# Patient Record
Sex: FEMALE | Race: WHITE | NOT HISPANIC OR LATINO | Employment: FULL TIME | URBAN - METROPOLITAN AREA
[De-identification: names, ages, dates, MRNs, and addresses within clinical notes are randomized per-mention and may not be internally consistent; named-entity substitution may affect disease eponyms.]

---

## 2017-01-23 ENCOUNTER — TELEPHONE (OUTPATIENT)
Dept: INTERNAL MEDICINE | Facility: CLINIC | Age: 62
End: 2017-01-23

## 2017-01-23 ENCOUNTER — HOSPITAL ENCOUNTER (OUTPATIENT)
Dept: RADIOLOGY | Facility: HOSPITAL | Age: 62
Discharge: HOME | End: 2017-01-23
Attending: ORTHOPAEDIC SURGERY
Payer: COMMERCIAL

## 2017-01-23 ENCOUNTER — OFFICE VISIT (OUTPATIENT)
Dept: ORTHOPEDICS | Facility: CLINIC | Age: 62
End: 2017-01-23
Payer: COMMERCIAL

## 2017-01-23 VITALS
BODY MASS INDEX: 25.78 KG/M2 | HEIGHT: 64 IN | WEIGHT: 151 LBS | SYSTOLIC BLOOD PRESSURE: 157 MMHG | RESPIRATION RATE: 16 BRPM | HEART RATE: 79 BPM | DIASTOLIC BLOOD PRESSURE: 79 MMHG

## 2017-01-23 DIAGNOSIS — Z00.00 ANNUAL PHYSICAL EXAM: Primary | ICD-10-CM

## 2017-01-23 DIAGNOSIS — M54.42 ACUTE BACK PAIN WITH SCIATICA, LEFT: ICD-10-CM

## 2017-01-23 DIAGNOSIS — M25.552 LEFT HIP PAIN: Primary | ICD-10-CM

## 2017-01-23 DIAGNOSIS — M25.552 LEFT HIP PAIN: ICD-10-CM

## 2017-01-23 PROCEDURE — 72114 X-RAY EXAM L-S SPINE BENDING: CPT | Mod: 26,,, | Performed by: RADIOLOGY

## 2017-01-23 PROCEDURE — 99999 PR PBB SHADOW E&M-EST. PATIENT-LVL IV: CPT | Mod: PBBFAC,,, | Performed by: PHYSICIAN ASSISTANT

## 2017-01-23 PROCEDURE — 73502 X-RAY EXAM HIP UNI 2-3 VIEWS: CPT | Mod: TC,LT

## 2017-01-23 PROCEDURE — 1159F MED LIST DOCD IN RCRD: CPT | Mod: S$GLB,,, | Performed by: PHYSICIAN ASSISTANT

## 2017-01-23 PROCEDURE — 73502 X-RAY EXAM HIP UNI 2-3 VIEWS: CPT | Mod: 26,LT,, | Performed by: RADIOLOGY

## 2017-01-23 PROCEDURE — 99214 OFFICE O/P EST MOD 30 MIN: CPT | Mod: S$GLB,,, | Performed by: PHYSICIAN ASSISTANT

## 2017-01-23 PROCEDURE — 72114 X-RAY EXAM L-S SPINE BENDING: CPT | Mod: TC

## 2017-01-23 RX ORDER — MELOXICAM 15 MG/1
15 TABLET ORAL DAILY
Qty: 30 TABLET | Refills: 2 | Status: SHIPPED | OUTPATIENT
Start: 2017-01-23 | End: 2017-02-22

## 2017-01-23 RX ORDER — CYCLOBENZAPRINE HCL 10 MG
10 TABLET ORAL 3 TIMES DAILY PRN
Qty: 30 TABLET | Refills: 0 | Status: SHIPPED | OUTPATIENT
Start: 2017-01-23 | End: 2017-02-02

## 2017-01-23 NOTE — TELEPHONE ENCOUNTER
----- Message from Mirella Orourke sent at 1/23/2017  4:01 PM CST -----  Contact: Self.   Doctor appointment and lab have been scheduled.  Please link lab orders to the lab appointment.  Date of doctor appointment:  4/26  Physical or EP:  Epp  Date of lab appointment:  4/19  Comments:

## 2017-01-23 NOTE — PROGRESS NOTES
SUBJECTIVE:     Chief Complaint & History of Present Illness:  Carla Aguilar is a New  patient 61 y.o. female who is seen here today with a complaint of    Chief Complaint   Patient presents with    Left Hip - Pain    .  Patient reports she developed pain and tenderness in the midportion of her low back for the past year.  Reports pain has increased to the past few months although there has been no significant change in her activity levels.  She does not have much tenderness to palpation but does notice pain originating in the midportion of low back and radiating down the left leg posterior lateral past the knee to the mid calf.  She denies any weakness paresthesias or changes in sensation no difficulty with bowel or bladder control  On a scale of 1-10, with 10 being worst pain imaginable, he rates this pain as 3 on good days and 5 on bad days.  she describes the pain as sore and achy.    Review of patient's allergies indicates:  No Known Allergies      Current Outpatient Prescriptions   Medication Sig Dispense Refill    cyclobenzaprine (FLEXERIL) 10 MG tablet Take 1 tablet (10 mg total) by mouth 3 (three) times daily as needed for Muscle spasms. 30 tablet 0    meloxicam (MOBIC) 15 MG tablet Take 1 tablet (15 mg total) by mouth once daily. 30 tablet 2    paroxetine (PAXIL) 10 MG tablet Take 1 tablet (10 mg total) by mouth once daily. 30 tablet 11    paroxetine (PAXIL) 20 MG tablet Take 1 tablet (20 mg total) by mouth once daily. 30 tablet 11     No current facility-administered medications for this visit.        Past Medical History   Diagnosis Date    Anxiety     Gallstones     Hyperlipidemia     Thrombocytopenia        Past Surgical History   Procedure Laterality Date    Thyroidectomy, partial         Vital Signs (Most Recent)  Vitals:    01/23/17 1343   BP: (!) 157/79   Pulse: 79   Resp: 16           Review of Systems:  ROS:  Constitutional: no fever or chills  Eyes: no visual changes  ENT: no nasal  "congestion or sore throat  Respiratory: no cough or shortness of breath  Cardiovascular: no chest pain or palpitations  Gastrointestinal: no nausea or vomiting, tolerating diet, Positive for follow-up liver disease  Genitourinary: no hematuria or dysuria  Integument/Breast: no rash or pruritis  Hematologic/Lymphatic: no easy bruising or lymphadenopathy  Musculoskeletal: no arthralgias or myalgias  Neurological: no seizures or tremors  Behavioral/Psych: no auditory or visual hallucinations, Positive for generalized anxiety disorder  Endocrine: no heat or cold intolerance                OBJECTIVE:     PHYSICAL EXAM:  Height: 5' 4" (162.6 cm) Weight: 68.5 kg (151 lb 0.2 oz), General Appearance: Well nourished, well developed, in no acute distress.  Neurological: Mood & affect are normal.  Back Exam:  full range of motion, no tenderness, palpable spasm or pain on motion, tenderness noted paraspinal muscle surrounding L4-L5 L5-S1, negative straight-leg raise bilaterally , normal reflexes and strength bilateral lower extremities, sensory exam intact bilateral lower extremities  Negative for: Kyphosis, Scoliosis, Decreased ROM and Positive for: Tenderness to percussion or palpation  Hip Examination:  full painless range of motion, without tenderness    RADIOGRAPHS:  X-rays lumbar spine demonstrates listhesis of L4 on L5 with neutral positioning     ASSESSMENT/PLAN:     Plan: We discussed with the patient at length all the different treatment options available for her spine including anti-inflammatories, acetaminophen, rest, ice, physical therapy, strengthening and range of motion exercise, occasional cortisone injections for temporary relief, and finally possible surgical interventions  We'll begin with a course of conservative treatment  Cyclobenzaprine 10 mg 3 times a day ×2 days followed by every afternoon  Meloxicam 15 mg daily with food ×10 days followed by when necessary  Physical therapy for spine program  Follow-up " in 3 weeks to assess progress sooner symptoms dictate

## 2017-01-30 ENCOUNTER — CLINICAL SUPPORT (OUTPATIENT)
Dept: REHABILITATION | Facility: HOSPITAL | Age: 62
End: 2017-01-30
Attending: ORTHOPAEDIC SURGERY
Payer: COMMERCIAL

## 2017-01-30 DIAGNOSIS — M54.42 ACUTE BACK PAIN WITH SCIATICA, LEFT: ICD-10-CM

## 2017-01-30 PROCEDURE — 97161 PT EVAL LOW COMPLEX 20 MIN: CPT

## 2017-01-30 PROCEDURE — 97110 THERAPEUTIC EXERCISES: CPT

## 2017-01-30 NOTE — PROGRESS NOTES
"[facility]  PATIENT EVALUATION        Name:Carla Aguilar  Physician:Dontae Trinidad MD  Orders:  Physical Therapy evaluate and treat  Clinical#:[id number]  Diagnosis:No diagnosis found.     Visit 1    HPI: Patient with 10 year h/o low back pain which has gotten worse over the past several months.  Pain with standing/cooking.  Unable to vacuum or complete household chores without pain.  Pain wakes her up at night, "tosses and turns"  Pain in back radiating into left leg.  (+) paresthesias. Pain relieved immediately upon sitting.  Has to sit after walking 5 minutes.  Has been going to an aquatic exercise twice a week for the past year without relief.    SUBJECTIVE:    Pain: 0/10 at best, 8/10 at worst, 0/10 currently  Chief complaint: low back pain, left leg pain  Radicular symptoms: left leg pain  Bowel and Bladder incontinence: none  Aggravating factors: vacuuming, walking  Easing factors: sitting  Sleep is  disturbed. Sleeping position: prone  Previous functional status: independent without limitation  Current functional status : pain limits walking and household chores      Allergies:  Review of patient's allergies indicates:  No Known Allergies    Medical history:   Past Medical History   Diagnosis Date    Anxiety     Gallstones     Hyperlipidemia     Thrombocytopenia        Past Surgical History   Procedure Laterality Date    Thyroidectomy, partial         Medication:   Current Outpatient Prescriptions on File Prior to Visit   Medication Sig Dispense Refill    cyclobenzaprine (FLEXERIL) 10 MG tablet Take 1 tablet (10 mg total) by mouth 3 (three) times daily as needed for Muscle spasms. 30 tablet 0    meloxicam (MOBIC) 15 MG tablet Take 1 tablet (15 mg total) by mouth once daily. 30 tablet 2    paroxetine (PAXIL) 10 MG tablet Take 1 tablet (10 mg total) by mouth once daily. 30 tablet 11    paroxetine (PAXIL) 20 MG tablet Take 1 tablet (20 mg total) by mouth once daily. 30 tablet 11 "     No current facility-administered medications on file prior to visit.        OBJECTIVE:_.  Posture:forward head, increased  thoracic kyphosis, normal lumbar lordosis      Lumbar AROM :   - flexion 0 cm (-)  - extension 75% (-)  - left sidebending 75% (-)  - right sidebending 75% (-)  - left rotation 75% (-)  - right rotation 75% (-)    LE strength:     R L   Hip flexion 5 5   Hip extension 4 4   Glute max 4 4-   Hip abduction 4 4   Knee extension 5 5   Knee flexion 5 5   Ankle DF 5 5   Ankle PF 5 5       Special tests:  (-) MICHAEL    Palpation: tender left ITB    Joint mobility: (+) stiffness CPA L5,4,3      Sensation: light touch intact BLE    Reflexes: NT    TREATMENT:  Pt was educated, performed, and was provided with a written copy of  HEP to perform as tolerated, including:    Bridges  LTR  Bicycle stabilization  Side lying clamshells with TB    Exercises were reviewed and pt was able to demonstrate them prior to the end of the session.     No cultural, environmental, or spiritual barriers identified to treatment or learning.      ASSESSMENT   Patient presents with limitations in  ROM, strength and posture affecting her ADLs and household chores .  She  will benefit from outpatient physical therapy to improve her ROM, strength and posture to enable her  to return to full pain free function      Medical necessity is demonstrated by the following  IMPAIRMENTS:  - poor posture  - pain  - decreased flexibility  - decreased muscle strength  - impaired function  - decreased work ability      SHORT TERM GOALS:  (4  Weeks) Pt agrees with goals set.  1. Supervised HEP.  2. Able to walk 1 mile with less than 3/10 pain  3. Able to cook with less than 3/10 pain  4. ROM WNL to enable normal gait and ADLs    LONG TERM GOALS:  (8  Weeks) Pt agrees with goals set.  1. Independent with HEP.  2. Able to walk 3 miles with less than 3/10 pain  3. Able to vacuum with less than 3/10 pain  4. Strength WNL to enable normal gait and  ADLs    PLAN:  Outpatient physical therapy once every 2 weeks to include: pt ed, hep, therapeutic exercises, neuromuscular re-education/ balance exercises, joint mobilizations, modalities prn. Pt may be seen by PTA to carry out plan of care.      Cont PT for 8 weeks.     Nathen Bullock, PT, DPT    I certify the need for these services furnished under this plan of treatment and while under my care.    ____________________________________ Physician/Referring Practitioner                                               Date of Signature

## 2017-02-06 NOTE — PLAN OF CARE
"[facility]  PATIENT EVALUATION        Name:Carla Aguilar  Physician:Dontae Trinidad MD  Orders:  Physical Therapy evaluate and treat  Clinical#:[id number]  Diagnosis:No diagnosis found.     Visit 1    HPI: Patient with 10 year h/o low back pain which has gotten worse over the past several months.  Pain with standing/cooking.  Unable to vacuum or complete household chores without pain.  Pain wakes her up at night, "tosses and turns"  Pain in back radiating into left leg.  (+) paresthesias. Pain relieved immediately upon sitting.  Has to sit after walking 5 minutes.  Has been going to an aquatic exercise twice a week for the past year without relief.    SUBJECTIVE:    Pain: 0/10 at best, 8/10 at worst, 0/10 currently  Chief complaint: low back pain, left leg pain  Radicular symptoms: left leg pain  Bowel and Bladder incontinence: none  Aggravating factors: vacuuming, walking  Easing factors: sitting  Sleep is  disturbed. Sleeping position: prone  Previous functional status: independent without limitation  Current functional status : pain limits walking and household chores      Allergies:  Review of patient's allergies indicates:  No Known Allergies    Medical history:   Past Medical History   Diagnosis Date    Anxiety     Gallstones     Hyperlipidemia     Thrombocytopenia        Past Surgical History   Procedure Laterality Date    Thyroidectomy, partial         Medication:   Current Outpatient Prescriptions on File Prior to Visit   Medication Sig Dispense Refill    cyclobenzaprine (FLEXERIL) 10 MG tablet Take 1 tablet (10 mg total) by mouth 3 (three) times daily as needed for Muscle spasms. 30 tablet 0    meloxicam (MOBIC) 15 MG tablet Take 1 tablet (15 mg total) by mouth once daily. 30 tablet 2    paroxetine (PAXIL) 10 MG tablet Take 1 tablet (10 mg total) by mouth once daily. 30 tablet 11    paroxetine (PAXIL) 20 MG tablet Take 1 tablet (20 mg total) by mouth once daily. 30 tablet 11 "     No current facility-administered medications on file prior to visit.        OBJECTIVE:_.  Posture:forward head, increased  thoracic kyphosis, normal lumbar lordosis      Lumbar AROM :   - flexion 0 cm (-)  - extension 75% (-)  - left sidebending 75% (-)  - right sidebending 75% (-)  - left rotation 75% (-)  - right rotation 75% (-)    LE strength:     R L   Hip flexion 5 5   Hip extension 4 4   Glute max 4 4-   Hip abduction 4 4   Knee extension 5 5   Knee flexion 5 5   Ankle DF 5 5   Ankle PF 5 5       Special tests:  (-) MICHAEL    Palpation: tender left ITB    Joint mobility: (+) stiffness CPA L5,4,3      Sensation: light touch intact BLE    Reflexes: NT    TREATMENT:  Pt was educated, performed, and was provided with a written copy of  HEP to perform as tolerated, including:    Bridges  LTR  Bicycle stabilization  Side lying clamshells with TB    Exercises were reviewed and pt was able to demonstrate them prior to the end of the session.     No cultural, environmental, or spiritual barriers identified to treatment or learning.      ASSESSMENT   Patient presents with limitations in  ROM, strength and posture affecting her ADLs and household chores .  She  will benefit from outpatient physical therapy to improve her ROM, strength and posture to enable her  to return to full pain free function      Medical necessity is demonstrated by the following  IMPAIRMENTS:  - poor posture  - pain  - decreased flexibility  - decreased muscle strength  - impaired function  - decreased work ability      SHORT TERM GOALS:  (4  Weeks) Pt agrees with goals set.  1. Supervised HEP.  2. Able to walk 1 mile with less than 3/10 pain  3. Able to cook with less than 3/10 pain  4. ROM WNL to enable normal gait and ADLs    LONG TERM GOALS:  (8  Weeks) Pt agrees with goals set.  1. Independent with HEP.  2. Able to walk 3 miles with less than 3/10 pain  3. Able to vacuum with less than 3/10 pain  4. Strength WNL to enable normal gait and  ADLs    PLAN:  Outpatient physical therapy once every 2 weeks to include: pt ed, hep, therapeutic exercises, neuromuscular re-education/ balance exercises, joint mobilizations, modalities prn. Pt may be seen by PTA to carry out plan of care.      Cont PT for 8 weeks.     Nahten Bullock, PT, DPT    I certify the need for these services furnished under this plan of treatment and while under my care.    ____________________________________ Physician/Referring Practitioner                                               Date of Signature

## 2017-04-19 ENCOUNTER — LAB VISIT (OUTPATIENT)
Dept: LAB | Facility: HOSPITAL | Age: 62
End: 2017-04-19
Attending: INTERNAL MEDICINE
Payer: COMMERCIAL

## 2017-04-19 DIAGNOSIS — Z00.00 ANNUAL PHYSICAL EXAM: ICD-10-CM

## 2017-04-19 LAB
25(OH)D3+25(OH)D2 SERPL-MCNC: 43 NG/ML
ALBUMIN SERPL BCP-MCNC: 4.4 G/DL
ALP SERPL-CCNC: 86 U/L
ALT SERPL W/O P-5'-P-CCNC: 66 U/L
ANION GAP SERPL CALC-SCNC: 14 MMOL/L
AST SERPL-CCNC: 78 U/L
BASOPHILS # BLD AUTO: 0.03 K/UL
BASOPHILS NFR BLD: 0.6 %
BILIRUB SERPL-MCNC: 0.7 MG/DL
BUN SERPL-MCNC: 8 MG/DL
CALCIUM SERPL-MCNC: 9.7 MG/DL
CHLORIDE SERPL-SCNC: 104 MMOL/L
CHOLEST/HDLC SERPL: 2.8 {RATIO}
CO2 SERPL-SCNC: 27 MMOL/L
CREAT SERPL-MCNC: 0.7 MG/DL
DIFFERENTIAL METHOD: ABNORMAL
EOSINOPHIL # BLD AUTO: 0 K/UL
EOSINOPHIL NFR BLD: 0.8 %
ERYTHROCYTE [DISTWIDTH] IN BLOOD BY AUTOMATED COUNT: 13.9 %
EST. GFR  (AFRICAN AMERICAN): >60 ML/MIN/1.73 M^2
EST. GFR  (NON AFRICAN AMERICAN): >60 ML/MIN/1.73 M^2
GLUCOSE SERPL-MCNC: 116 MG/DL
HCT VFR BLD AUTO: 40.6 %
HDL/CHOLESTEROL RATIO: 36.4 %
HDLC SERPL-MCNC: 124 MG/DL
HDLC SERPL-MCNC: 341 MG/DL
HGB BLD-MCNC: 13.8 G/DL
LDLC SERPL CALC-MCNC: 201.4 MG/DL
LYMPHOCYTES # BLD AUTO: 0.9 K/UL
LYMPHOCYTES NFR BLD: 17.4 %
MCH RBC QN AUTO: 36.2 PG
MCHC RBC AUTO-ENTMCNC: 34 %
MCV RBC AUTO: 107 FL
MONOCYTES # BLD AUTO: 0.7 K/UL
MONOCYTES NFR BLD: 13.4 %
NEUTROPHILS # BLD AUTO: 3.5 K/UL
NEUTROPHILS NFR BLD: 67.6 %
NONHDLC SERPL-MCNC: 217 MG/DL
PLATELET # BLD AUTO: 150 K/UL
PMV BLD AUTO: 11.2 FL
POTASSIUM SERPL-SCNC: 4 MMOL/L
PROT SERPL-MCNC: 7.5 G/DL
RBC # BLD AUTO: 3.81 M/UL
SODIUM SERPL-SCNC: 145 MMOL/L
TRIGL SERPL-MCNC: 78 MG/DL
TSH SERPL DL<=0.005 MIU/L-ACNC: 1.22 UIU/ML
WBC # BLD AUTO: 5.16 K/UL

## 2017-04-19 PROCEDURE — 80053 COMPREHEN METABOLIC PANEL: CPT

## 2017-04-19 PROCEDURE — 85025 COMPLETE CBC W/AUTO DIFF WBC: CPT

## 2017-04-19 PROCEDURE — 82306 VITAMIN D 25 HYDROXY: CPT

## 2017-04-19 PROCEDURE — 80061 LIPID PANEL: CPT

## 2017-04-19 PROCEDURE — 36415 COLL VENOUS BLD VENIPUNCTURE: CPT | Mod: PO

## 2017-04-19 PROCEDURE — 84443 ASSAY THYROID STIM HORMONE: CPT

## 2017-05-01 ENCOUNTER — OFFICE VISIT (OUTPATIENT)
Dept: INTERNAL MEDICINE | Facility: CLINIC | Age: 62
End: 2017-05-01
Payer: COMMERCIAL

## 2017-05-01 VITALS
BODY MASS INDEX: 27.36 KG/M2 | HEART RATE: 95 BPM | WEIGHT: 160.25 LBS | HEIGHT: 64 IN | SYSTOLIC BLOOD PRESSURE: 119 MMHG | DIASTOLIC BLOOD PRESSURE: 71 MMHG | RESPIRATION RATE: 16 BRPM | TEMPERATURE: 99 F

## 2017-05-01 DIAGNOSIS — Z12.31 VISIT FOR SCREENING MAMMOGRAM: ICD-10-CM

## 2017-05-01 DIAGNOSIS — K70.9 ALCOHOLIC LIVER DISEASE: ICD-10-CM

## 2017-05-01 DIAGNOSIS — K21.9 GASTROESOPHAGEAL REFLUX DISEASE, ESOPHAGITIS PRESENCE NOT SPECIFIED: ICD-10-CM

## 2017-05-01 DIAGNOSIS — E78.5 HYPERLIPIDEMIA, UNSPECIFIED HYPERLIPIDEMIA TYPE: ICD-10-CM

## 2017-05-01 DIAGNOSIS — Z12.11 SCREEN FOR COLON CANCER: ICD-10-CM

## 2017-05-01 DIAGNOSIS — Z00.00 ANNUAL PHYSICAL EXAM: Primary | ICD-10-CM

## 2017-05-01 DIAGNOSIS — F41.9 ANXIETY: ICD-10-CM

## 2017-05-01 DIAGNOSIS — F41.1 GAD (GENERALIZED ANXIETY DISORDER): ICD-10-CM

## 2017-05-01 PROCEDURE — 99999 PR PBB SHADOW E&M-EST. PATIENT-LVL III: CPT | Mod: PBBFAC,,, | Performed by: INTERNAL MEDICINE

## 2017-05-01 PROCEDURE — 99396 PREV VISIT EST AGE 40-64: CPT | Mod: S$GLB,,, | Performed by: INTERNAL MEDICINE

## 2017-05-01 PROCEDURE — 82270 OCCULT BLOOD FECES: CPT | Mod: S$GLB,,, | Performed by: INTERNAL MEDICINE

## 2017-05-01 RX ORDER — PAROXETINE HYDROCHLORIDE 20 MG/1
20 TABLET, FILM COATED ORAL DAILY
Qty: 30 TABLET | Refills: 11 | Status: SHIPPED | OUTPATIENT
Start: 2017-05-01 | End: 2018-05-16 | Stop reason: SDUPTHER

## 2017-05-01 RX ORDER — OMEPRAZOLE 20 MG/1
20 CAPSULE, DELAYED RELEASE ORAL DAILY
Qty: 30 CAPSULE | Refills: 11 | Status: SHIPPED | OUTPATIENT
Start: 2017-05-01 | End: 2017-08-22 | Stop reason: CLARIF

## 2017-05-01 RX ORDER — PAROXETINE 10 MG/1
10 TABLET, FILM COATED ORAL DAILY
Qty: 30 TABLET | Refills: 11 | Status: SHIPPED | OUTPATIENT
Start: 2017-05-01 | End: 2018-05-16 | Stop reason: SDUPTHER

## 2017-05-01 NOTE — PROGRESS NOTES
Subjective:       Patient ID: Carla Aguilar is a 61 y.o. female.    Chief Complaint: Annual Exam    Patient is a 61 y.o.female who presents today for annual.    Cholesterol: (reviewed)  Vaccines: Influenza (yearly); Tetanus (2016) ; Zoster (done)  Eye exam: due this month  Mammogram: due in june  Gyn exam: dr. Finch; due this year  Colonoscopy: declines; agrees to stool cards    Review of Systems   Constitutional: Negative for appetite change, chills, diaphoresis, fatigue and fever.   HENT: Negative for congestion, dental problem, ear discharge, ear pain, hearing loss, postnasal drip, sinus pressure and sore throat.    Eyes: Negative for discharge, redness and itching.   Respiratory: Negative for cough, chest tightness, shortness of breath and wheezing.    Cardiovascular: Negative for chest pain, palpitations and leg swelling.   Gastrointestinal: Negative for abdominal pain, constipation, diarrhea, nausea and vomiting.   Endocrine: Negative for cold intolerance and heat intolerance.   Genitourinary: Negative for difficulty urinating, frequency, hematuria and urgency.   Musculoskeletal: Negative for arthralgias, back pain, gait problem, myalgias and neck pain.   Skin: Negative for color change and rash.   Neurological: Negative for dizziness, syncope and headaches.   Hematological: Negative for adenopathy.   Psychiatric/Behavioral: Negative for behavioral problems and sleep disturbance. The patient is not nervous/anxious.        Objective:      Physical Exam   Constitutional: She is oriented to person, place, and time. She appears well-developed and well-nourished. No distress.   HENT:   Head: Normocephalic and atraumatic.   Right Ear: External ear normal.   Left Ear: External ear normal.   Eyes: Conjunctivae and EOM are normal. Pupils are equal, round, and reactive to light. Right eye exhibits no discharge. Left eye exhibits no discharge. No scleral icterus.   Neck: Normal range of motion. Neck supple. No JVD  present. No thyromegaly present.   Cardiovascular: Normal rate, regular rhythm, normal heart sounds and intact distal pulses.  Exam reveals no gallop and no friction rub.    No murmur heard.  Pulmonary/Chest: Effort normal and breath sounds normal. No stridor. No respiratory distress. She has no wheezes. She has no rales. She exhibits no tenderness.   Abdominal: Soft. Bowel sounds are normal. She exhibits no distension. There is no tenderness. There is no rebound.   Musculoskeletal: Normal range of motion. She exhibits no edema or tenderness.   Lymphadenopathy:     She has no cervical adenopathy.   Neurological: She is alert and oriented to person, place, and time.   Skin: Skin is warm. No rash noted. She is not diaphoretic. No erythema.   Psychiatric: She has a normal mood and affect. Her behavior is normal.   Nursing note and vitals reviewed.      Assessment and Plan:       1. Annual physical exam  - labs reviewed  - mammo due soon  - gyn exam due now  - cscope: declines    2. Visit for screening mammogram  - Mammo Digital Screening Bilat with CAD; Future    3. Screen for colon cancer  - POCT Hemocult Stool X3    4. Hyperlipidemia, unspecified hyperlipidemia type  - increase dosage of krill oil; repeat lipid in three months  - discussed low cholesterol diet and exercise  - Lipid panel; Future    5. Anxiety  - stable on paxil 30 mg daily  - paroxetine (PAXIL) 20 MG tablet; Take 1 tablet (20 mg total) by mouth once daily.  Dispense: 30 tablet; Refill: 11    6. TIERRA (generalized anxiety disorder)  - stable on paxil    7. Alcoholic liver disease  - lfts trending down    8. Gastroesophageal reflux disease, esophagitis presence not specified  - prilosec daily  - discussed gerd diet        No Follow-up on file.

## 2017-05-01 NOTE — MR AVS SNAPSHOT
[facility]   [facility]  [place] [address]  Phone: [phone]  Fax: [phone]                  Carla Aguilar   2017 1:40 PM   Office Visit    Description:  Female : 1955   Provider:  Freda Wooten DO   Department:  [facility]           Reason for Visit     Annual Exam           Diagnoses this Visit        Comments    Annual physical exam    -  Primary     Visit for screening mammogram         Screen for colon cancer         Hyperlipidemia, unspecified hyperlipidemia type         Anxiety         TIERRA (generalized anxiety disorder)         Alcoholic liver disease         Gastroesophageal reflux disease, esophagitis presence not specified                To Do List           Future Appointments        Provider Department Dept Phone    2017 3:15 PM Marco Martel OD [facility] [phone]      Goals (5 Years of Data)     None      Follow-Up and Disposition     Return in about 1 year (around 2018).       These Medications        Disp Refills Start End    paroxetine (PAXIL) 20 MG tablet 30 tablet 11 2017     Take 1 tablet (20 mg total) by mouth once daily. - Oral    Pharmacy: [facility] [address] [place] [address] Ph #: [phone]       paroxetine (PAXIL) 10 MG tablet 30 tablet 11 2017     Take 1 tablet (10 mg total) by mouth once daily. - Oral    Pharmacy: [facility] [address] [place] [address] Ph #: [phone]       omeprazole (PRILOSEC) 20 MG capsule 30 capsule 11 2017     Take 1 capsule (20 mg total) by mouth once daily. - Oral    Pharmacy: [facility] [address] [place] [address] Ph #: [phone]         [facility]     [facility] Nurse Care Line - 24/ Assistance  Unless otherwise directed by your provider, please contact [facility], our nurse care line that is available for 24/7 assistance.     Registered  "nurses in the [facility] Center provide: appointment scheduling, clinical advisement, health education, and other advisory services.  Call: [phone] (toll free)               Medications           START taking these NEW medications        Refills    omeprazole (PRILOSEC) 20 MG capsule 11    Sig: Take 1 capsule (20 mg total) by mouth once daily.    Class: Normal    Route: Oral           Verify that the below list of medications is an accurate representation of the medications you are currently taking.  If none reported, the list may be blank. If incorrect, please contact your healthcare provider. Carry this list with you in case of emergency.           Current Medications     paroxetine (PAXIL) 10 MG tablet Take 1 tablet (10 mg total) by mouth once daily.    paroxetine (PAXIL) 20 MG tablet Take 1 tablet (20 mg total) by mouth once daily.    omeprazole (PRILOSEC) 20 MG capsule Take 1 capsule (20 mg total) by mouth once daily.           Clinical Reference Information           Your Vitals Were     BP Pulse Temp Resp Height Weight    119/71 (BP Location: Right arm, Patient Position: Sitting, BP Method: Manual) 95 98.5 °F (36.9 °C) (Oral) 16 5' 4" (1.626 m) 72.7 kg (160 lb 4.4 oz)    BMI                27.51 kg/m2          Blood Pressure          Most Recent Value    BP  119/71      Allergies as of 5/1/2017     No Known Allergies      Immunizations Administered on Date of Encounter - 5/1/2017     None      Orders Placed During Today's Visit      Normal Orders This Visit    POCT Hemocult Stool X3     Future Labs/Procedures Expected by Expires    Lipid panel  5/1/2017 5/1/2019    Mammo Digital Screening Bilat with CAD  5/1/2017 7/1/2018      Smoking Cessation     If you would like to quit smoking:   You may be eligible for free services if you are a [place] resident and started smoking cigarettes before September 1, 1988.  Call the Smoking Cessation [employer] (SCT) toll free at (346) 758-5332 or (463) " 691-4341.   Call 1-800-QUIT-NOW if you do not meet the above criteria.   Contact us via email: [email or web address]   View our website for more information: [email or web address]        Language Assistance Services     ATTENTION: Language assistance services are available, free of charge. Please call [phone].      ATENCIÓN: Si habla español, tiene a bradley disposición servicios gratuitos de asistencia lingüística. Llame al [phone].     CHÚ Ý: N?u b?n nói Ti?ng Vi?t, có các d?ch v? h? tr? ngôn ng? mi?n phí dành cho b?n. G?i s? [phone].         [facility] complies with applicable Federal civil rights laws and does not discriminate on the basis of race, color, national origin, age, disability, or sex.

## 2017-05-11 ENCOUNTER — OFFICE VISIT (OUTPATIENT)
Dept: OPTOMETRY | Facility: CLINIC | Age: 62
End: 2017-05-11
Payer: COMMERCIAL

## 2017-05-11 DIAGNOSIS — H25.13 NUCLEAR SCLEROSIS, BILATERAL: Primary | ICD-10-CM

## 2017-05-11 DIAGNOSIS — H40.033 ANATOMICAL NARROW ANGLE, BILATERAL: ICD-10-CM

## 2017-05-11 PROCEDURE — 92012 INTRM OPH EXAM EST PATIENT: CPT | Mod: S$GLB,,, | Performed by: OPTOMETRIST

## 2017-05-11 PROCEDURE — 99999 PR PBB SHADOW E&M-EST. PATIENT-LVL II: CPT | Mod: PBBFAC,,, | Performed by: OPTOMETRIST

## 2017-05-11 PROCEDURE — 92020 GONIOSCOPY: CPT | Mod: S$GLB,,, | Performed by: OPTOMETRIST

## 2017-05-11 PROCEDURE — 92015 DETERMINE REFRACTIVE STATE: CPT | Mod: S$GLB,,, | Performed by: OPTOMETRIST

## 2017-05-23 ENCOUNTER — TELEPHONE (OUTPATIENT)
Dept: OPTOMETRY | Facility: CLINIC | Age: 62
End: 2017-05-23

## 2017-05-23 NOTE — TELEPHONE ENCOUNTER
----- Message from Marco Martel OD sent at 5/23/2017  2:43 PM CDT -----  Contact: Pt  I talked to Ms Aguilar.  She wishes to get a cataract eval instead of a PI.  Please call her to cancel her glauc appt and set her up to see Dr Barraza for cat eval.       ----- Message -----  From: Billie Zepeda  Sent: 5/23/2017   2:12 PM  To: Marco Martel OD    Pt states you told her that she has an option between cat sx and PI. Pt called to have appointment switched to cat eval and states she want to be sure that this is the best option for her.      ----- Message -----  From: Jennifer Landeros  Sent: 5/23/2017  10:54 AM  To: [facility] Staff    Pt would like to speak to Dr. Martel about the last visit she had. She can be reached at [phone]

## 2017-06-06 LAB
CTP QC/QA: YES
FECAL OCCULT BLOOD, POC: NEGATIVE

## 2017-06-09 ENCOUNTER — HOSPITAL ENCOUNTER (OUTPATIENT)
Dept: RADIOLOGY | Facility: HOSPITAL | Age: 62
Discharge: HOME | End: 2017-06-09
Attending: INTERNAL MEDICINE
Payer: COMMERCIAL

## 2017-06-09 DIAGNOSIS — Z12.31 VISIT FOR SCREENING MAMMOGRAM: ICD-10-CM

## 2017-06-09 PROCEDURE — 77067 SCR MAMMO BI INCL CAD: CPT | Mod: 26,,, | Performed by: RADIOLOGY

## 2017-06-09 PROCEDURE — 77063 BREAST TOMOSYNTHESIS BI: CPT | Mod: 26,,, | Performed by: RADIOLOGY

## 2017-06-09 PROCEDURE — 77067 SCR MAMMO BI INCL CAD: CPT | Mod: TC

## 2017-06-14 ENCOUNTER — OFFICE VISIT (OUTPATIENT)
Dept: OPHTHALMOLOGY | Facility: CLINIC | Age: 62
End: 2017-06-14
Payer: COMMERCIAL

## 2017-06-14 DIAGNOSIS — H25.13 NUCLEAR SCLEROSIS, BILATERAL: Primary | ICD-10-CM

## 2017-06-14 PROCEDURE — 92136 OPHTHALMIC BIOMETRY: CPT | Mod: RT,S$GLB,, | Performed by: OPHTHALMOLOGY

## 2017-06-14 PROCEDURE — 92014 COMPRE OPH EXAM EST PT 1/>: CPT | Mod: S$GLB,,, | Performed by: OPHTHALMOLOGY

## 2017-06-14 PROCEDURE — 99999 PR PBB SHADOW E&M-EST. PATIENT-LVL II: CPT | Mod: PBBFAC,,, | Performed by: OPHTHALMOLOGY

## 2017-06-14 RX ORDER — MOXIFLOXACIN 5 MG/ML
1 SOLUTION/ DROPS OPHTHALMIC
Status: CANCELLED | OUTPATIENT
Start: 2017-06-14

## 2017-06-14 RX ORDER — TETRACAINE HYDROCHLORIDE 5 MG/ML
1 SOLUTION OPHTHALMIC
Status: CANCELLED | OUTPATIENT
Start: 2017-06-14

## 2017-06-14 RX ORDER — TROPICAMIDE 10 MG/ML
1 SOLUTION/ DROPS OPHTHALMIC
Status: CANCELLED | OUTPATIENT
Start: 2017-06-14

## 2017-06-14 RX ORDER — LIDOCAINE HYDROCHLORIDE 10 MG/ML
1 INJECTION, SOLUTION EPIDURAL; INFILTRATION; INTRACAUDAL; PERINEURAL ONCE
Status: CANCELLED | OUTPATIENT
Start: 2017-06-14 | End: 2017-06-14

## 2017-06-14 RX ORDER — PHENYLEPHRINE HYDROCHLORIDE 25 MG/ML
1 SOLUTION/ DROPS OPHTHALMIC
Status: CANCELLED | OUTPATIENT
Start: 2017-06-14

## 2017-06-14 NOTE — LETTER
June 14, 2017      Marco Martel, OD  [address]  [facility] [address]           [facility]  [address]  [place] [address]  Phone: [phone]  Fax: [phone]          Patient: Carla Aguilar   MR Number: [id number]   YOB: 1955   Date of Visit: 6/14/2017       Dear Dr. Marco Martel:    Thank you for referring Carla Aguilar to me for evaluation. Attached you will find relevant portions of my assessment and plan of care.    If you have questions, please do not hesitate to call me. I look forward to following Carla Aguilar along with you.    Sincerely,    Aaron Barraza MD    Enclosure  CC:  No Recipients    If you would like to receive this communication electronically, please contact [email or web address] or [phone] to request more information on [employer] access.    For providers and/or their staff who would like to refer a patient to [facility], please contact us through our one-stop-shop provider referral line, [facility], at [phone].    If you feel you have received this communication in error or would no longer like to receive these types of communications, please e-mail [email or web address]
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
## 2017-06-21 ENCOUNTER — TELEPHONE (OUTPATIENT)
Dept: OPHTHALMOLOGY | Facility: CLINIC | Age: 62
End: 2017-06-21

## 2017-06-21 DIAGNOSIS — H25.11 NUCLEAR SCLEROTIC CATARACT OF RIGHT EYE: Primary | ICD-10-CM

## 2017-07-19 ENCOUNTER — TELEPHONE (OUTPATIENT)
Dept: OPHTHALMOLOGY | Facility: CLINIC | Age: 62
End: 2017-07-19

## 2017-07-19 DIAGNOSIS — H25.12 NUCLEAR SCLEROTIC CATARACT OF LEFT EYE: Primary | ICD-10-CM

## 2017-08-22 RX ORDER — IBUPROFEN 200 MG
200 TABLET ORAL EVERY 6 HOURS PRN
COMMUNITY

## 2017-08-24 ENCOUNTER — TELEPHONE (OUTPATIENT)
Dept: OPTOMETRY | Facility: CLINIC | Age: 62
End: 2017-08-24

## 2017-08-25 DIAGNOSIS — Z12.11 COLON CANCER SCREENING: ICD-10-CM

## 2017-08-28 ENCOUNTER — HOSPITAL ENCOUNTER (OUTPATIENT)
Facility: OTHER | Age: 62
Discharge: HOME | End: 2017-08-28
Attending: OPHTHALMOLOGY | Admitting: OPHTHALMOLOGY
Payer: COMMERCIAL

## 2017-08-28 ENCOUNTER — ANESTHESIA (OUTPATIENT)
Dept: SURGERY | Facility: OTHER | Age: 62
End: 2017-08-28
Payer: COMMERCIAL

## 2017-08-28 ENCOUNTER — ANESTHESIA EVENT (OUTPATIENT)
Dept: SURGERY | Facility: OTHER | Age: 62
End: 2017-08-28
Payer: COMMERCIAL

## 2017-08-28 ENCOUNTER — SURGERY (OUTPATIENT)
Age: 62
End: 2017-08-28

## 2017-08-28 VITALS
RESPIRATION RATE: 18 BRPM | DIASTOLIC BLOOD PRESSURE: 88 MMHG | HEIGHT: 64 IN | BODY MASS INDEX: 27.31 KG/M2 | WEIGHT: 160 LBS | HEART RATE: 95 BPM | SYSTOLIC BLOOD PRESSURE: 149 MMHG | TEMPERATURE: 99 F

## 2017-08-28 DIAGNOSIS — H25.13 NUCLEAR SCLEROSIS, BILATERAL: ICD-10-CM

## 2017-08-28 DIAGNOSIS — H25.11 NUCLEAR SCLEROSIS, RIGHT: Primary | ICD-10-CM

## 2017-08-28 PROBLEM — H25.10 NUCLEAR SCLEROSIS: Status: ACTIVE | Noted: 2017-08-28

## 2017-08-28 PROCEDURE — V2632 POST CHMBR INTRAOCULAR LENS: HCPCS | Performed by: OPHTHALMOLOGY

## 2017-08-28 PROCEDURE — 36000707: Performed by: OPHTHALMOLOGY

## 2017-08-28 PROCEDURE — 36000706: Performed by: OPHTHALMOLOGY

## 2017-08-28 PROCEDURE — 37000009 HC ANESTHESIA EA ADD 15 MINS: Performed by: OPHTHALMOLOGY

## 2017-08-28 PROCEDURE — 66984 XCAPSL CTRC RMVL W/O ECP: CPT | Mod: RT,,, | Performed by: OPHTHALMOLOGY

## 2017-08-28 PROCEDURE — 25000003 PHARM REV CODE 250: Performed by: OPHTHALMOLOGY

## 2017-08-28 PROCEDURE — 71000015 HC POSTOP RECOV 1ST HR: Performed by: OPHTHALMOLOGY

## 2017-08-28 PROCEDURE — 37000008 HC ANESTHESIA 1ST 15 MINUTES: Performed by: OPHTHALMOLOGY

## 2017-08-28 PROCEDURE — 63600175 PHARM REV CODE 636 W HCPCS: Performed by: NURSE ANESTHETIST, CERTIFIED REGISTERED

## 2017-08-28 DEVICE — IMPLANTABLE DEVICE: Type: IMPLANTABLE DEVICE | Site: EYE | Status: FUNCTIONAL

## 2017-08-28 RX ORDER — SODIUM CHLORIDE, SODIUM LACTATE, POTASSIUM CHLORIDE, CALCIUM CHLORIDE 600; 310; 30; 20 MG/100ML; MG/100ML; MG/100ML; MG/100ML
INJECTION, SOLUTION INTRAVENOUS CONTINUOUS PRN
Status: DISCONTINUED | OUTPATIENT
Start: 2017-08-28 | End: 2017-08-28

## 2017-08-28 RX ORDER — OXYCODONE HYDROCHLORIDE 5 MG/1
5 TABLET ORAL
Status: DISCONTINUED | OUTPATIENT
Start: 2017-08-28 | End: 2017-08-28 | Stop reason: HOSPADM

## 2017-08-28 RX ORDER — ACETAMINOPHEN 325 MG/1
650 TABLET ORAL EVERY 4 HOURS PRN
Status: DISCONTINUED | OUTPATIENT
Start: 2017-08-28 | End: 2017-08-28 | Stop reason: HOSPADM

## 2017-08-28 RX ORDER — MIDAZOLAM HYDROCHLORIDE 1 MG/ML
INJECTION INTRAMUSCULAR; INTRAVENOUS
Status: DISCONTINUED | OUTPATIENT
Start: 2017-08-28 | End: 2017-08-28

## 2017-08-28 RX ORDER — LIDOCAINE HYDROCHLORIDE 40 MG/ML
INJECTION, SOLUTION RETROBULBAR
Status: DISCONTINUED | OUTPATIENT
Start: 2017-08-28 | End: 2017-08-28 | Stop reason: HOSPADM

## 2017-08-28 RX ORDER — FENTANYL CITRATE 50 UG/ML
25 INJECTION, SOLUTION INTRAMUSCULAR; INTRAVENOUS EVERY 5 MIN PRN
Status: DISCONTINUED | OUTPATIENT
Start: 2017-08-28 | End: 2017-08-28 | Stop reason: HOSPADM

## 2017-08-28 RX ORDER — HYDROMORPHONE HYDROCHLORIDE 2 MG/ML
0.4 INJECTION, SOLUTION INTRAMUSCULAR; INTRAVENOUS; SUBCUTANEOUS EVERY 5 MIN PRN
Status: DISCONTINUED | OUTPATIENT
Start: 2017-08-28 | End: 2017-08-28 | Stop reason: HOSPADM

## 2017-08-28 RX ORDER — LIDOCAINE HYDROCHLORIDE 10 MG/ML
1 INJECTION, SOLUTION EPIDURAL; INFILTRATION; INTRACAUDAL; PERINEURAL ONCE
Status: DISCONTINUED | OUTPATIENT
Start: 2017-08-28 | End: 2017-08-28 | Stop reason: HOSPADM

## 2017-08-28 RX ORDER — TETRACAINE HYDROCHLORIDE 5 MG/ML
1 SOLUTION OPHTHALMIC
Status: COMPLETED | OUTPATIENT
Start: 2017-08-28 | End: 2017-08-28

## 2017-08-28 RX ORDER — MEPERIDINE HYDROCHLORIDE 50 MG/ML
12.5 INJECTION INTRAMUSCULAR; INTRAVENOUS; SUBCUTANEOUS ONCE AS NEEDED
Status: DISCONTINUED | OUTPATIENT
Start: 2017-08-28 | End: 2017-08-28 | Stop reason: HOSPADM

## 2017-08-28 RX ORDER — PHENYLEPHRINE HYDROCHLORIDE 25 MG/ML
1 SOLUTION/ DROPS OPHTHALMIC
Status: COMPLETED | OUTPATIENT
Start: 2017-08-28 | End: 2017-08-28

## 2017-08-28 RX ORDER — TROPICAMIDE 10 MG/ML
1 SOLUTION/ DROPS OPHTHALMIC
Status: COMPLETED | OUTPATIENT
Start: 2017-08-28 | End: 2017-08-28

## 2017-08-28 RX ORDER — TETRACAINE HYDROCHLORIDE 5 MG/ML
SOLUTION OPHTHALMIC
Status: DISCONTINUED | OUTPATIENT
Start: 2017-08-28 | End: 2017-08-28 | Stop reason: HOSPADM

## 2017-08-28 RX ORDER — MOXIFLOXACIN 5 MG/ML
1 SOLUTION/ DROPS OPHTHALMIC
Status: COMPLETED | OUTPATIENT
Start: 2017-08-28 | End: 2017-08-28

## 2017-08-28 RX ORDER — SODIUM CHLORIDE 0.9 % (FLUSH) 0.9 %
3 SYRINGE (ML) INJECTION
Status: DISCONTINUED | OUTPATIENT
Start: 2017-08-28 | End: 2017-08-28 | Stop reason: HOSPADM

## 2017-08-28 RX ORDER — PROPARACAINE HYDROCHLORIDE 5 MG/ML
1 SOLUTION/ DROPS OPHTHALMIC
Status: DISCONTINUED | OUTPATIENT
Start: 2017-08-28 | End: 2017-08-28 | Stop reason: HOSPADM

## 2017-08-28 RX ORDER — MOXIFLOXACIN 5 MG/ML
SOLUTION/ DROPS OPHTHALMIC
Status: DISCONTINUED | OUTPATIENT
Start: 2017-08-28 | End: 2017-08-28 | Stop reason: HOSPADM

## 2017-08-28 RX ORDER — PHENYLEPHRINE HYDROCHLORIDE 100 MG/ML
SOLUTION/ DROPS OPHTHALMIC
Status: DISCONTINUED | OUTPATIENT
Start: 2017-08-28 | End: 2017-08-28 | Stop reason: HOSPADM

## 2017-08-28 RX ADMIN — MOXIFLOXACIN HYDROCHLORIDE 1 DROP: 5 SOLUTION/ DROPS OPHTHALMIC at 08:08

## 2017-08-28 RX ADMIN — MOXIFLOXACIN HYDROCHLORIDE 1 DROP: 5 SOLUTION/ DROPS OPHTHALMIC at 07:08

## 2017-08-28 RX ADMIN — MIDAZOLAM HYDROCHLORIDE 1 MG: 1 INJECTION, SOLUTION INTRAMUSCULAR; INTRAVENOUS at 08:08

## 2017-08-28 RX ADMIN — PHENYLEPHRINE HYDROCHLORIDE 1 DROP: 100 SOLUTION/ DROPS OPHTHALMIC at 07:08

## 2017-08-28 RX ADMIN — TETRACAINE HYDROCHLORIDE 1 DROP: 5 SOLUTION OPHTHALMIC at 07:08

## 2017-08-28 RX ADMIN — PHENYLEPHRINE HYDROCHLORIDE 1 DROP: 25 SOLUTION/ DROPS OPHTHALMIC at 07:08

## 2017-08-28 RX ADMIN — LIDOCAINE HYDROCHLORIDE 4 DROP: 40 INJECTION, SOLUTION RETROBULBAR; TOPICAL at 08:08

## 2017-08-28 RX ADMIN — TROPICAMIDE 1 DROP: 10 SOLUTION/ DROPS OPHTHALMIC at 07:08

## 2017-08-28 RX ADMIN — TETRACAINE HYDROCHLORIDE 1 DROP: 5 SOLUTION OPHTHALMIC at 08:08

## 2017-08-28 RX ADMIN — BALANCED SALT SOLUTION ENRICHED WITH BICARBONATE, DEXTROSE, AND GLUTATHIONE 500 ML: KIT at 08:08

## 2017-08-28 RX ADMIN — SODIUM CHONDROITIN SULFATE / SODIUM HYALURONATE 0.5 ML: 0.55-0.5 INJECTION INTRAOCULAR at 08:08

## 2017-08-28 NOTE — DISCHARGE SUMMARY
Outcome: Successful outpatient ophthalmic surgical procedure  Preprinted Instructions given to patient.  Regular diet.  Activity: No restrictions  Meds: see Med Rec  Condition: stable  Follow up: 1 day with Dr Barraza  Disposition: Home  Diagnosis: s/p eye surgery

## 2017-08-28 NOTE — DISCHARGE INSTRUCTIONS
Aaron Barraza MD  [facility]  Department of Ophthalmology      AFTER: Cataract Surgery:  Relax at home and DO NOT exert yourself for the rest of the day.  Plan to see Dr. Barraza tomorrow at the eye clinic:   [facility] [facility] [address]  [place] [address]    Refer to attached eye drop instruction sheet     Precautions:  DO NOT rub your eye.  You may resume moderate activity the day after surgery.  Wear protective sunglasses during the day and a shield at night for 1(one) week.  If you have pain, redness and decreased vision, call Dr. Barraza (or the on-call doctor after hours) @[phone].            Home Care Instructions for Eye Surgeries    1. ACTIVITY:  Limit your activity today. Relax at home and DO NOT exert yourself for the rest of the day. Increase activity gradually. You may return to work or school as directed by your physician.    2. DIET:  Drink plenty of fluids. Resume your normal diet unless instructed otherwise.    3. PAIN:  Expect a moderate amount of pain. If a prescription for pain is not sent home with you, you may take your commonly used pain reliever as directed. If this is not sufficient, call your physician. You may resume any other prescription medication unless otherwise directed by your physician.     Discuss any problem with your physician as soon as it arises. Do not Delay.      EMERGENCY- If you are unable to contact your physician, please go to the nearest Emergency Room.       Anesthesia: Monitored Anesthesia Care (MAC)    Anesthesia Safety  · Have an adult family member or friend drive you home after the procedure.  · For the first 24 hours after your surgery:  · Do not drive or use heavy equipment.  · Do not make important decisions or sign documents.  · Avoid alcohol.  · Have someone stay with you, if possible. They can watch for problems and help keep you safe.

## 2017-08-28 NOTE — PLAN OF CARE
Patient prefers to have Edilia Aguilar, sister, present for discharge teaching. Please contact them @ [phone].

## 2017-08-28 NOTE — ANESTHESIA PREPROCEDURE EVALUATION
08/28/2017  Carla Aguilar is a 61 y.o., female.    Anesthesia Evaluation    I have reviewed the Patient Summary Reports.    I have reviewed the Nursing Notes.   I have reviewed the Medications.     Review of Systems  Anesthesia Hx:  No problems with previous Anesthesia  Denies Family Hx of Anesthesia complications.   Denies Personal Hx of Anesthesia complications.   Social:  Smoker 4-5 cigs per day   Hematology/Oncology:  Hematology Normal   Oncology Normal     EENT/Dental:EENT/Dental Normal   Cardiovascular:  Cardiovascular Normal     Pulmonary:  Pulmonary Normal    Renal/:  Renal/ Normal     Hepatic/GI:  Hepatic/GI Normal    Musculoskeletal:  Musculoskeletal Normal    Neurological:  Neurology Normal    Endocrine:  Endocrine Normal    Dermatological:  Skin Normal    Psych:   depression          Physical Exam  General:  Well nourished    Airway/Jaw/Neck:  Airway Findings: General Airway Assessment: Adult Mallampati: II      Dental:  Dental Findings: In tact              Anesthesia Plan  Type of Anesthesia, risks & benefits discussed:  Anesthesia Type:  MAC  Patient's Preference:   Intra-op Monitoring Plan:   Intra-op Monitoring Plan Comments:   Post Op Pain Control Plan:   Post Op Pain Control Plan Comments:   Induction:    Beta Blocker:         Informed Consent: Patient understands risks and agrees with Anesthesia plan.  Questions answered.   ASA Score: 2     Day of Surgery Review of History & Physical:    H&P update referred to the surgeon.         Ready For Surgery From Anesthesia Perspective.

## 2017-08-28 NOTE — ANESTHESIA POSTPROCEDURE EVALUATION
"Anesthesia Post Evaluation    Patient: Carla Aguilar    Procedure(s) Performed: Procedure(s) (LRB):  PHACOEMULSIFICATION-ASPIRATION-CATARACT (Right)  INSERTION-INTRAOCULAR LENS (IOL) (Right)    Final Anesthesia Type: MAC  Patient location during evaluation: [facility]  Patient participation: Yes- Able to Participate  Level of consciousness: awake and alert  Post-procedure vital signs: reviewed and stable  Pain management: adequate  Airway patency: patent  PONV status at discharge: No PONV  Anesthetic complications: no      Cardiovascular status: blood pressure returned to baseline  Respiratory status: unassisted, spontaneous ventilation and room air  Hydration status: euvolemic  Follow-up not needed.        Visit Vitals  BP (!) 176/93   Pulse 95   Temp 37.2 °C (98.9 °F) (Oral)   Resp 18   Ht 5' 4" (1.626 m)   Wt 72.6 kg (160 lb)   Breastfeeding? No   BMI 27.46 kg/m²       Pain/Patt Score: Pain Assessment Performed: Yes (8/28/2017  7:28 AM)  Presence of Pain: denies (8/28/2017  7:28 AM)      "

## 2017-08-28 NOTE — OP NOTE
SURGEON:  Aaron Barraza M.D.    PREOPERATIVE DIAGNOSIS:    Nuclear Sclerotic Cataract Right Eye    POSTOPERATIVE DIAGNOSIS:    Nuclear Sclerotic Cataract Right Eye    PROCEDURES:    Phacoemulsification with  intraocular lens, Right eye (67582)    DATE OF SURGERY: 08/28/2017    IMPLANT: pcboo 24.5    ANESTHESIA:  MAC with topical Lidocaine    COMPLICATIONS:  None    ESTIMATED BLOOD LOSS: None    SPECIMENS: None    INDICATIONS:    The patient has a history of painless progressive visual loss and  difficulty with activities of daily living secondary to cataract formation.  After a thorough discussion of the risks, benefits, and alternatives to cataract surgery, including, but not limited to, the rare risks of infection, retinal detachment, hemorrhage, need for additional surgery, loss of vision, and even loss of the eye, the patient voices understanding and desires to proceed.    DESCRIPTION OF PROCEDURE:    The patients IOL calculations were reviewed, and the lens selection confirmed.   After verification and marking of the proper eye in the preop holding area, the patient was brought to the operating room in supine position where the eye was prepped and draped in standard sterile fashion with 5% Betadine and a lid speculum placed in the eye.   Topical 4% Lidocaine was used in addition to the preoperative anesthesia and the procedure was begun by the creation of a paracentesis incision through which viscoelastic was used to fill the anterior chamber.  Next, a keratome blade was used to create a triplanar temporal clear corneal incision and a cystotome and Utrata forceps used to fashion a continuous curvilinear capsulorrhexis.  Hydrodissection was carried out using the Lambert hydrodissection cannula and the nucleus was found to be mobile.  Phacoemulsification of the nucleus was carried out using a quick chop technique, and all remaining epinuclear and cortical material was removed.  The eye was then reformed with  Viscoelastic and the  intraocular lens was implanted into the capsular bag.  All remaining viscoelastics were removed from the eye and at the end of the case the pupil was round, the lens was well-centered within the capsular bag and all wounds were found to be water tight.  Drops of Vigamox and Pred Forte were instilled and a shield was placed over the eye. The patient will follow up with Dr. Barraza in the morning.

## 2017-08-28 NOTE — PLAN OF CARE
Carla Aguilar has met all discharge criteria from Phase II. Vital Signs are stable, ambulating  without difficulty. Discharge instructions given, patient verbalized understanding. Discharged from facility via wheelchair in stable condition.

## 2017-08-29 ENCOUNTER — OFFICE VISIT (OUTPATIENT)
Dept: OPHTHALMOLOGY | Facility: CLINIC | Age: 62
End: 2017-08-29
Attending: OPHTHALMOLOGY
Payer: COMMERCIAL

## 2017-08-29 DIAGNOSIS — H25.11 NUCLEAR SCLEROTIC CATARACT OF RIGHT EYE: ICD-10-CM

## 2017-08-29 DIAGNOSIS — Z98.890 POST-OPERATIVE STATE: Primary | ICD-10-CM

## 2017-08-29 PROCEDURE — 99999 PR PBB SHADOW E&M-EST. PATIENT-LVL II: CPT | Mod: PBBFAC,,, | Performed by: OPHTHALMOLOGY

## 2017-08-29 PROCEDURE — 99024 POSTOP FOLLOW-UP VISIT: CPT | Mod: S$GLB,,, | Performed by: OPHTHALMOLOGY

## 2017-08-29 NOTE — PROGRESS NOTES
HPI     POD 1 Phaco w/IOL OD 8/28/17  Dr. Barraza    Pt states she did well last night.      Eye meds:  P/M/G TID    Last edited by Juliet Romero on 8/29/2017  8:11 AM. (History)            Assessment /Plan     For exam results, see Encounter Report.    Post-operative state    Nuclear sclerotic cataract of right eye      Slit lamp exam:  L/L: nl  K: clear, wound sealed  AC: 1+ cell  Lens: IOL centered and stable    POD1 s/p Phaco/IOL  Appropriate precautions and post op medications reviewed.  Patient instructed to call or come in if symptoms of redness, decreased vision, or pain are experienced.

## 2017-09-06 ENCOUNTER — OFFICE VISIT (OUTPATIENT)
Dept: OPHTHALMOLOGY | Facility: CLINIC | Age: 62
End: 2017-09-06
Payer: COMMERCIAL

## 2017-09-06 DIAGNOSIS — H25.12 NUCLEAR SCLEROSIS, LEFT: ICD-10-CM

## 2017-09-06 DIAGNOSIS — H25.11 NUCLEAR SCLEROTIC CATARACT OF RIGHT EYE: ICD-10-CM

## 2017-09-06 DIAGNOSIS — Z98.890 POST-OPERATIVE STATE: Primary | ICD-10-CM

## 2017-09-06 PROCEDURE — 99999 PR PBB SHADOW E&M-EST. PATIENT-LVL II: CPT | Mod: PBBFAC,,, | Performed by: OPHTHALMOLOGY

## 2017-09-06 PROCEDURE — 99024 POSTOP FOLLOW-UP VISIT: CPT | Mod: S$GLB,,, | Performed by: OPHTHALMOLOGY

## 2017-09-06 RX ORDER — TROPICAMIDE 10 MG/ML
1 SOLUTION/ DROPS OPHTHALMIC
Status: CANCELLED | OUTPATIENT
Start: 2017-09-06

## 2017-09-06 RX ORDER — PHENYLEPHRINE HYDROCHLORIDE 25 MG/ML
1 SOLUTION/ DROPS OPHTHALMIC
Status: CANCELLED | OUTPATIENT
Start: 2017-09-06

## 2017-09-06 RX ORDER — MOXIFLOXACIN 5 MG/ML
1 SOLUTION/ DROPS OPHTHALMIC
Status: CANCELLED | OUTPATIENT
Start: 2017-09-06

## 2017-09-06 RX ORDER — TETRACAINE HYDROCHLORIDE 5 MG/ML
1 SOLUTION OPHTHALMIC
Status: CANCELLED | OUTPATIENT
Start: 2017-09-06

## 2017-09-06 RX ORDER — LIDOCAINE HYDROCHLORIDE 10 MG/ML
1 INJECTION, SOLUTION EPIDURAL; INFILTRATION; INTRACAUDAL; PERINEURAL ONCE
Status: CANCELLED | OUTPATIENT
Start: 2017-09-06 | End: 2017-09-06

## 2017-09-06 NOTE — PROGRESS NOTES
HPI     POW 1 Phaco w/IOL OD 8/28/17  Dr. Barraza    Pt states she will be glad to get the other eye done. Sees better without   glasses OD, Os is blurred. With glasses OS is good and OD is blurred.      Eye meds:  P/M/G TID    Last edited by Juliet Romero on 9/6/2017  2:29 PM. (History)            Assessment /Plan     For exam results, see Encounter Report.    Post-operative state    Nuclear sclerotic cataract of right eye      Slit lamp exam:  L/L: nl  K: clear, wound sealed  AC: trace cell  Iris/Lens: IOL centered and stable    POW1 s/p phaco: Surgery healing well with no signs of infection or abnormal inflammation.    Patient wishes to proceed with surgery in the second eye. Risks, benefits, alternatives reviewed. IOL selection reviewed.     Left eye  IOL: pcboo 24.5

## 2017-09-14 ENCOUNTER — TELEPHONE (OUTPATIENT)
Dept: OPTOMETRY | Facility: CLINIC | Age: 62
End: 2017-09-14

## 2017-09-18 ENCOUNTER — ANESTHESIA (OUTPATIENT)
Dept: SURGERY | Facility: OTHER | Age: 62
End: 2017-09-18
Payer: COMMERCIAL

## 2017-09-18 ENCOUNTER — SURGERY (OUTPATIENT)
Age: 62
End: 2017-09-18

## 2017-09-18 ENCOUNTER — ANESTHESIA EVENT (OUTPATIENT)
Dept: SURGERY | Facility: OTHER | Age: 62
End: 2017-09-18
Payer: COMMERCIAL

## 2017-09-18 ENCOUNTER — HOSPITAL ENCOUNTER (OUTPATIENT)
Facility: OTHER | Age: 62
Discharge: HOME | End: 2017-09-18
Attending: OPHTHALMOLOGY | Admitting: OPHTHALMOLOGY
Payer: COMMERCIAL

## 2017-09-18 VITALS
DIASTOLIC BLOOD PRESSURE: 75 MMHG | TEMPERATURE: 99 F | WEIGHT: 160 LBS | HEART RATE: 74 BPM | OXYGEN SATURATION: 93 % | SYSTOLIC BLOOD PRESSURE: 161 MMHG | BODY MASS INDEX: 27.31 KG/M2 | HEIGHT: 64 IN | RESPIRATION RATE: 16 BRPM

## 2017-09-18 DIAGNOSIS — H25.12 NUCLEAR SCLEROSIS, LEFT: ICD-10-CM

## 2017-09-18 DIAGNOSIS — H25.12 NUCLEAR SCLEROTIC CATARACT OF LEFT EYE: Primary | ICD-10-CM

## 2017-09-18 DIAGNOSIS — Z98.890 POST-OPERATIVE STATE: ICD-10-CM

## 2017-09-18 PROCEDURE — 71000015 HC POSTOP RECOV 1ST HR: Performed by: OPHTHALMOLOGY

## 2017-09-18 PROCEDURE — 37000008 HC ANESTHESIA 1ST 15 MINUTES: Performed by: OPHTHALMOLOGY

## 2017-09-18 PROCEDURE — 37000009 HC ANESTHESIA EA ADD 15 MINS: Performed by: OPHTHALMOLOGY

## 2017-09-18 PROCEDURE — 63600175 PHARM REV CODE 636 W HCPCS: Performed by: NURSE ANESTHETIST, CERTIFIED REGISTERED

## 2017-09-18 PROCEDURE — 25000003 PHARM REV CODE 250: Performed by: NURSE ANESTHETIST, CERTIFIED REGISTERED

## 2017-09-18 PROCEDURE — 36000707: Performed by: OPHTHALMOLOGY

## 2017-09-18 PROCEDURE — V2632 POST CHMBR INTRAOCULAR LENS: HCPCS | Performed by: OPHTHALMOLOGY

## 2017-09-18 PROCEDURE — 25000003 PHARM REV CODE 250: Performed by: OPHTHALMOLOGY

## 2017-09-18 PROCEDURE — 66984 XCAPSL CTRC RMVL W/O ECP: CPT | Mod: 79,LT,, | Performed by: OPHTHALMOLOGY

## 2017-09-18 PROCEDURE — 36000706: Performed by: OPHTHALMOLOGY

## 2017-09-18 DEVICE — LENS IOL ITEC PRELOAD 24.5D: Type: IMPLANTABLE DEVICE | Site: EYE | Status: FUNCTIONAL

## 2017-09-18 RX ORDER — TETRACAINE HYDROCHLORIDE 5 MG/ML
1 SOLUTION OPHTHALMIC
Status: COMPLETED | OUTPATIENT
Start: 2017-09-18 | End: 2017-09-18

## 2017-09-18 RX ORDER — MOXIFLOXACIN 5 MG/ML
SOLUTION/ DROPS OPHTHALMIC
Status: DISCONTINUED | OUTPATIENT
Start: 2017-09-18 | End: 2017-09-18 | Stop reason: HOSPADM

## 2017-09-18 RX ORDER — PROPARACAINE HYDROCHLORIDE 5 MG/ML
1 SOLUTION/ DROPS OPHTHALMIC
Status: DISCONTINUED | OUTPATIENT
Start: 2017-09-18 | End: 2017-09-18 | Stop reason: HOSPADM

## 2017-09-18 RX ORDER — LIDOCAINE HYDROCHLORIDE 40 MG/ML
INJECTION, SOLUTION RETROBULBAR
Status: DISCONTINUED | OUTPATIENT
Start: 2017-09-18 | End: 2017-09-18 | Stop reason: HOSPADM

## 2017-09-18 RX ORDER — PHENYLEPHRINE HYDROCHLORIDE 25 MG/ML
1 SOLUTION/ DROPS OPHTHALMIC
Status: COMPLETED | OUTPATIENT
Start: 2017-09-18 | End: 2017-09-18

## 2017-09-18 RX ORDER — ACETAMINOPHEN 325 MG/1
650 TABLET ORAL EVERY 4 HOURS PRN
Status: DISCONTINUED | OUTPATIENT
Start: 2017-09-18 | End: 2017-09-18 | Stop reason: HOSPADM

## 2017-09-18 RX ORDER — MOXIFLOXACIN 5 MG/ML
1 SOLUTION/ DROPS OPHTHALMIC
Status: COMPLETED | OUTPATIENT
Start: 2017-09-18 | End: 2017-09-18

## 2017-09-18 RX ORDER — TETRACAINE HYDROCHLORIDE 5 MG/ML
SOLUTION OPHTHALMIC
Status: DISCONTINUED | OUTPATIENT
Start: 2017-09-18 | End: 2017-09-18 | Stop reason: HOSPADM

## 2017-09-18 RX ORDER — MIDAZOLAM HYDROCHLORIDE 1 MG/ML
INJECTION INTRAMUSCULAR; INTRAVENOUS
Status: DISCONTINUED | OUTPATIENT
Start: 2017-09-18 | End: 2017-09-18

## 2017-09-18 RX ORDER — TROPICAMIDE 10 MG/ML
1 SOLUTION/ DROPS OPHTHALMIC
Status: COMPLETED | OUTPATIENT
Start: 2017-09-18 | End: 2017-09-18

## 2017-09-18 RX ORDER — LIDOCAINE HYDROCHLORIDE 10 MG/ML
1 INJECTION, SOLUTION EPIDURAL; INFILTRATION; INTRACAUDAL; PERINEURAL ONCE
Status: DISCONTINUED | OUTPATIENT
Start: 2017-09-18 | End: 2017-09-18 | Stop reason: HOSPADM

## 2017-09-18 RX ORDER — SODIUM CHLORIDE 9 MG/ML
INJECTION, SOLUTION INTRAVENOUS CONTINUOUS PRN
Status: DISCONTINUED | OUTPATIENT
Start: 2017-09-18 | End: 2017-09-18

## 2017-09-18 RX ADMIN — LIDOCAINE HYDROCHLORIDE 2 DROP: 40 INJECTION, SOLUTION RETROBULBAR; TOPICAL at 07:09

## 2017-09-18 RX ADMIN — MOXIFLOXACIN HYDROCHLORIDE 1 DROP: 5 SOLUTION/ DROPS OPHTHALMIC at 08:09

## 2017-09-18 RX ADMIN — MIDAZOLAM HYDROCHLORIDE 2 MG: 1 INJECTION, SOLUTION INTRAMUSCULAR; INTRAVENOUS at 07:09

## 2017-09-18 RX ADMIN — MOXIFLOXACIN HYDROCHLORIDE 1 DROP: 5 SOLUTION/ DROPS OPHTHALMIC at 07:09

## 2017-09-18 RX ADMIN — BALANCED SALT SOLUTION ENRICHED WITH BICARBONATE, DEXTROSE, AND GLUTATHIONE 15 ML: KIT at 07:09

## 2017-09-18 RX ADMIN — TROPICAMIDE 1 DROP: 10 SOLUTION/ DROPS OPHTHALMIC at 06:09

## 2017-09-18 RX ADMIN — SODIUM CHLORIDE: 0.9 INJECTION, SOLUTION INTRAVENOUS at 06:09

## 2017-09-18 RX ADMIN — MOXIFLOXACIN HYDROCHLORIDE 1 DROP: 5 SOLUTION/ DROPS OPHTHALMIC at 06:09

## 2017-09-18 RX ADMIN — PHENYLEPHRINE HYDROCHLORIDE 1 DROP: 25 SOLUTION/ DROPS OPHTHALMIC at 06:09

## 2017-09-18 RX ADMIN — TETRACAINE HYDROCHLORIDE 2 DROP: 5 SOLUTION OPHTHALMIC at 07:09

## 2017-09-18 RX ADMIN — TETRACAINE HYDROCHLORIDE 1 DROP: 5 SOLUTION OPHTHALMIC at 06:09

## 2017-09-18 NOTE — PLAN OF CARE
Problem: Pain, Acute (Adult)  Goal: Acceptable Pain Control/Comfort Level  Patient will demonstrate the desired outcomes by discharge/transition of care.  Outcome: Outcome(s) achieved Date Met: 09/18/17  Carla Aguilar has met all discharge criteria from Phase II. Vital Signs are stable, ambulating  without difficulty.Pain is now under control and tolerable for the pt. Pain score 0 at this time.  Discharge instructions given, patient verbalized understanding. Discharged from facility via wheelchair in stable condition.

## 2017-09-18 NOTE — OP NOTE
SURGEON:  Aaron Barraza M.D.    PREOPERATIVE DIAGNOSIS:    Nuclear Sclerotic Cataract Left Eye    POSTOPERATIVE DIAGNOSIS:    Nuclear Sclerotic Cataract Left Eye    PROCEDURES:    Phacoemulsification with  intraocular lens, Left eye (83927)    DATE OF SURGERY: 09/18/2017    IMPLANT: pcboo 24.5    ANESTHESIA:  MAC with topical Lidocaine    COMPLICATIONS:  None    ESTIMATED BLOOD LOSS: None    SPECIMENS: None    INDICATIONS:    The patient has a history of painless progressive visual loss and  difficulty with activities of daily living secondary to cataract formation.  After a thorough discussion of the risks, benefits, and alternatives to cataract surgery, including, but not limited to, the rare risks of infection, retinal detachment, hemorrhage, need for additional surgery, loss of vision, and even loss of the eye, the patient voices understanding and desires to proceed.    DESCRIPTION OF PROCEDURE:    The patients IOL calculations were reviewed, and the lens selection confirmed.   After verification and marking of the proper eye in the preop holding area, the patient was brought to the operating room in supine position where the eye was prepped and draped in standard sterile fashion with 5% Betadine and a lid speculum placed in the eye.   Topical 4% Lidocaine was used in addition to the preoperative anesthesia and the procedure was begun by the creation of a paracentesis incision through which viscoelastic was used to fill the anterior chamber.  Next, a keratome blade was used to create a triplanar temporal clear corneal incision and a cystotome and Utrata forceps used to fashion a continuous curvilinear capsulorrhexis.  Hydrodissection was carried out using the Lambert hydrodissection cannula and the nucleus was found to be mobile.  Phacoemulsification of the nucleus was carried out using a quick chop technique, and all remaining epinuclear and cortical material was removed.  The eye was then reformed with  Viscoelastic and the  intraocular lens was implanted into the capsular bag.  All remaining viscoelastics were removed from the eye and at the end of the case the pupil was round, the lens was well-centered within the capsular bag and all wounds were found to be water tight.  Drops of Vigamox and Pred Forte were instilled and a shield was placed over the eye. The patient will follow up with Dr. Barraza in the morning.

## 2017-09-18 NOTE — ANESTHESIA POSTPROCEDURE EVALUATION
"Anesthesia Post Evaluation    Patient: Carla Aguilar    Procedure(s) Performed: Procedure(s) (LRB):  PHACOEMULSIFICATION-ASPIRATION-CATARACT (Left)  INSERTION-INTRAOCULAR LENS (IOL) (Left)    Final Anesthesia Type: MAC  Patient location during evaluation: [facility]  Patient participation: Yes- Able to Participate  Level of consciousness: awake and alert and oriented  Post-procedure vital signs: reviewed and stable  Pain management: adequate  Airway patency: patent  PONV status at discharge: No PONV  Anesthetic complications: no      Cardiovascular status: stable  Respiratory status: unassisted, spontaneous ventilation and room air  Hydration status: euvolemic  Follow-up not needed.        Visit Vitals  BP (!) 162/99 (BP Location: Right arm, Patient Position: Lying)   Pulse 77   Temp 36.9 °C (98.5 °F) (Oral)   Resp 16   Ht 5' 4" (1.626 m)   Wt 72.6 kg (160 lb)   SpO2 97%   Breastfeeding? No   BMI 27.46 kg/m²       Pain/Patt Score: Pain Assessment Performed: Yes (9/18/2017  6:40 AM)  Presence of Pain: denies (9/18/2017  6:40 AM)      "

## 2017-09-18 NOTE — DISCHARGE INSTRUCTIONS
Home Care Instructions for Eye Surgeries    1. ACTIVITY:   Limit your activity today. Increase activity gradually. You may return to work or school as directed by your physician.    2. DIET:   Drink plenty of fluids. Resume your normal diet unless instructed otherwise.  3. PAIN:   Expect a moderate amount of pain. If a prescription for pain is not sent home with you, you may take your commonly used pain reliever as directed. If this is not sufficient, call your physician. You may resume any other prescription medication unless otherwise directed by your physician.     4. DRESSING:   Keep your dressing dry and intact.  5. COMMENTS:   No driving, operating heavy machinery or singing legal documents for 24 hours.    No bending forward at the waist.   See attached schedule of eye drops.    Discuss any problem with your physician as soon as it arises. Do not Delay.      EMERGENCY- If you are unable to contact your physician, please go to the nearest Emergency Room.       Anesthesia: Monitored Anesthesia Care (MAC)    Anesthesia Safety  · Have an adult family member or friend drive you home after the procedure.  · For the first 24 hours after your surgery:  ¨ Do not drive or use heavy equipment.  ¨ Do not make important decisions or sign documents.  ¨ Avoid alcohol.  ¨ Have someone stay with you, if possible. They can watch for problems and help keep you safe.

## 2017-09-18 NOTE — ANESTHESIA PREPROCEDURE EVALUATION
09/18/2017  Carla Aguilar is a 61 y.o., female.    Pre-op Assessment    I have reviewed the Patient Summary Reports.     I have reviewed the Nursing Notes.   I have reviewed the Medications.     Review of Systems  Anesthesia Hx:  No problems with previous Anesthesia  Denies Family Hx of Anesthesia complications.   Denies Personal Hx of Anesthesia complications.   Social:  Smoker 4-5 cigs per day   Hematology/Oncology:  Hematology Normal   Oncology Normal     EENT/Dental:EENT/Dental Normal   Cardiovascular:  Cardiovascular Normal     Pulmonary:  Pulmonary Normal    Renal/:  Renal/ Normal     Hepatic/GI:  Hepatic/GI Normal    Musculoskeletal:  Musculoskeletal Normal    Neurological:  Neurology Normal    Endocrine:  Endocrine Normal    Dermatological:  Skin Normal    Psych:   depression          Physical Exam  General:  Well nourished    Airway/Jaw/Neck:  Airway Findings: General Airway Assessment: Adult Mallampati: II      Dental:  Dental Findings: In tact              Anesthesia Plan  Type of Anesthesia, risks & benefits discussed:  Anesthesia Type:  MAC  Patient's Preference:   Intra-op Monitoring Plan:   Intra-op Monitoring Plan Comments:   Post Op Pain Control Plan:   Post Op Pain Control Plan Comments:   Induction:    Beta Blocker:         Informed Consent: Patient understands risks and agrees with Anesthesia plan.  Questions answered.   ASA Score: 2     Day of Surgery Review of History & Physical:    H&P update referred to the surgeon.         Ready For Surgery From Anesthesia Perspective.

## 2017-09-19 ENCOUNTER — OFFICE VISIT (OUTPATIENT)
Dept: OPHTHALMOLOGY | Facility: CLINIC | Age: 62
End: 2017-09-19
Attending: OPHTHALMOLOGY
Payer: COMMERCIAL

## 2017-09-19 DIAGNOSIS — Z98.890 POST-OPERATIVE STATE: Primary | ICD-10-CM

## 2017-09-19 DIAGNOSIS — H25.12 NUCLEAR SCLEROSIS, LEFT: ICD-10-CM

## 2017-09-19 PROCEDURE — 99024 POSTOP FOLLOW-UP VISIT: CPT | Mod: S$GLB,,, | Performed by: OPHTHALMOLOGY

## 2017-09-19 PROCEDURE — 99999 PR PBB SHADOW E&M-EST. PATIENT-LVL II: CPT | Mod: PBBFAC,,, | Performed by: OPHTHALMOLOGY

## 2017-10-20 ENCOUNTER — OFFICE VISIT (OUTPATIENT)
Dept: OPTOMETRY | Facility: CLINIC | Age: 62
End: 2017-10-20
Payer: COMMERCIAL

## 2017-10-20 DIAGNOSIS — Z98.42 S/P BILATERAL CATARACT EXTRACTION: Primary | ICD-10-CM

## 2017-10-20 DIAGNOSIS — Z98.41 S/P BILATERAL CATARACT EXTRACTION: Primary | ICD-10-CM

## 2017-10-20 PROCEDURE — 99999 PR PBB SHADOW E&M-EST. PATIENT-LVL II: CPT | Mod: PBBFAC,,, | Performed by: OPTOMETRIST

## 2017-10-20 PROCEDURE — 99024 POSTOP FOLLOW-UP VISIT: CPT | Mod: S$GLB,,, | Performed by: OPTOMETRIST

## 2017-10-20 NOTE — PROGRESS NOTES
HPI     Post-op Evaluation    Additional comments: 1 month phaco OS           Comments   Last eye exam was 9/19/17 with Dr. Barraza.  Patient states distance vision is better since cataract sx. Using old   glasses for reading. Wanted to get reading glasses rx today.  Patient denies diplopia, headaches, flashes/floaters, and pain.    09/18/2017 IMPLANT: pcboo 24.5 OS  08/28/2017 IMPLANT: pcboo 24.5 OD       Last edited by Chen Shen on 10/20/2017  9:33 AM. (History)            Assessment /Plan     For exam results, see Encounter Report.    S/P bilateral cataract extraction  -     OCT- Retina            1.  Pt doing well.  Reading rx given.  Eye health normal OU.  Return care to Dr. Martel.

## 2018-01-09 ENCOUNTER — TELEPHONE (OUTPATIENT)
Dept: INTERNAL MEDICINE | Facility: CLINIC | Age: 63
End: 2018-01-09

## 2018-01-09 RX ORDER — OSELTAMIVIR PHOSPHATE 75 MG/1
75 CAPSULE ORAL 2 TIMES DAILY
Qty: 14 CAPSULE | Refills: 0 | Status: SHIPPED | OUTPATIENT
Start: 2018-01-09 | End: 2018-01-09 | Stop reason: SDUPTHER

## 2018-01-09 RX ORDER — OSELTAMIVIR PHOSPHATE 75 MG/1
75 CAPSULE ORAL 2 TIMES DAILY
Qty: 14 CAPSULE | Refills: 0 | Status: SHIPPED | OUTPATIENT
Start: 2018-01-09 | End: 2018-01-16

## 2018-01-09 NOTE — TELEPHONE ENCOUNTER
----- Message from Aliya Venus sent at 1/9/2018 10:54 AM CST -----  Contact: pt [phone]  Pt would like a call from the nurse pt said she has the flu and can not come see the doctor she has no way of getting here pt is asking can a script be sent to [facility] on [address] for doretha,please advise pt

## 2018-03-14 ENCOUNTER — TELEPHONE (OUTPATIENT)
Dept: INTERNAL MEDICINE | Facility: CLINIC | Age: 63
End: 2018-03-14

## 2018-03-14 RX ORDER — OSELTAMIVIR PHOSPHATE 75 MG/1
75 CAPSULE ORAL 2 TIMES DAILY
Qty: 10 CAPSULE | Refills: 0 | Status: SHIPPED | OUTPATIENT
Start: 2018-03-14 | End: 2018-03-24

## 2018-03-14 NOTE — TELEPHONE ENCOUNTER
----- Message from Annmarie Posada sent at 3/14/2018  9:39 AM CDT -----  Contact: Pt [phone]   Pt would like a refill on a script by the name Oseltamivir 75 mg. Pt would like to have this script sent to [facility] [phone].

## 2018-03-14 NOTE — TELEPHONE ENCOUNTER
----- Message from Mirella Orourke sent at 3/14/2018  8:33 AM CDT -----  Contact: Self  Call   [phone]  Patient would like to get medical advice.  Symptoms (please be specific):  Headache, fever, nausea, body ache, cough, dry throat  How long has patient had these symptoms:  X 3 days  Pharmacy name and phone #:  [facility] [phone] (Phone)  [phone] (Fax)  Any drug allergies:  NKDA    Comments: She has had the Flu already but feeling the same symptoms again. She's requesting Tamaflu

## 2018-03-14 NOTE — TELEPHONE ENCOUNTER
Pt requesting tamiflu for headache, fever, nausea, body aches, and cough. Requesting it to be sent to [facility].

## 2018-04-19 ENCOUNTER — TELEPHONE (OUTPATIENT)
Dept: INTERNAL MEDICINE | Facility: CLINIC | Age: 63
End: 2018-04-19

## 2018-04-19 DIAGNOSIS — D18.03 LIVER HEMANGIOMA: ICD-10-CM

## 2018-04-19 DIAGNOSIS — K70.9 ALCOHOLIC LIVER DISEASE: ICD-10-CM

## 2018-04-19 DIAGNOSIS — R16.0 LIVER MASS: ICD-10-CM

## 2018-04-19 DIAGNOSIS — Z00.00 WELL ADULT EXAM: Primary | ICD-10-CM

## 2018-04-19 DIAGNOSIS — F41.1 GAD (GENERALIZED ANXIETY DISORDER): ICD-10-CM

## 2018-04-19 DIAGNOSIS — K76.0 FATTY INFILTRATION OF LIVER: ICD-10-CM

## 2018-04-19 NOTE — TELEPHONE ENCOUNTER
----- Message from Dania Hernandez sent at 4/19/2018  7:59 AM CDT -----  Doctor appointment and lab have been scheduled.  Please link lab orders to the lab appointment.  Date of doctor appointment:  05/28/18  Physical or EP:  epp  Date of lab appointment:  05/21/18  Comments:

## 2018-05-16 DIAGNOSIS — F41.9 ANXIETY: ICD-10-CM

## 2018-05-16 RX ORDER — PAROXETINE 10 MG/1
10 TABLET, FILM COATED ORAL DAILY
Qty: 30 TABLET | Refills: 11 | Status: SHIPPED | OUTPATIENT
Start: 2018-05-16 | End: 2018-08-17 | Stop reason: SDUPTHER

## 2018-05-16 RX ORDER — PAROXETINE HYDROCHLORIDE 20 MG/1
20 TABLET, FILM COATED ORAL DAILY
Qty: 30 TABLET | Refills: 11 | Status: SHIPPED | OUTPATIENT
Start: 2018-05-16 | End: 2018-08-17 | Stop reason: SDUPTHER

## 2018-05-16 NOTE — TELEPHONE ENCOUNTER
"----- Message from Alma Espana LPN sent at 5/16/2018  2:33 PM CDT -----  Contact: Self [phone]      ----- Message -----  From: Kelin Rodriguez  Sent: 5/16/2018   2:11 PM  To: Nica RAMIREZ Staff    RX request - refill or new RX.  Is this a refill or new RX:  Refill  RX name and strength: paroxetine (PAXIL) 10 MG tablet  Directions:   Is this a 30 day or 90 day RX:    Pharmacy name and phone # (DON'T enter "on file" or "in chart"): [facility] [address] [facility] [address] [facility] [phone] (Phone)  [phone] (Fax      Comments:      RX request - refill or new RX.  Is this a refill or new RX:  Refill  RX name and strength: paroxetine (PAXIL) 20 MG tablet   Directions:   Is this a 30 day or 90 day RX:    Pharmacy name and phone # (DON'T enter "on file" or "in chart"): [facility] [address] [facility] [phone] (Phone)  [phone] (Fax      Comments:        "

## 2018-05-20 ENCOUNTER — NURSE TRIAGE (OUTPATIENT)
Dept: ADMINISTRATIVE | Facility: CLINIC | Age: 63
End: 2018-05-20

## 2018-05-20 NOTE — TELEPHONE ENCOUNTER
Message left on SPOK, Pt wanted to cancel lab appointment for 5/21/2018.  Left note on appointment time.  Left message on Pt's voicemail

## 2018-05-23 ENCOUNTER — LAB VISIT (OUTPATIENT)
Dept: LAB | Facility: HOSPITAL | Age: 63
End: 2018-05-23
Attending: FAMILY MEDICINE
Payer: COMMERCIAL

## 2018-05-23 ENCOUNTER — OFFICE VISIT (OUTPATIENT)
Dept: INTERNAL MEDICINE | Facility: CLINIC | Age: 63
End: 2018-05-23
Payer: COMMERCIAL

## 2018-05-23 VITALS
BODY MASS INDEX: 27.59 KG/M2 | SYSTOLIC BLOOD PRESSURE: 126 MMHG | TEMPERATURE: 99 F | HEIGHT: 64 IN | RESPIRATION RATE: 16 BRPM | WEIGHT: 161.63 LBS | DIASTOLIC BLOOD PRESSURE: 71 MMHG | HEART RATE: 97 BPM

## 2018-05-23 DIAGNOSIS — L08.9 SUPERFICIAL SKIN INFECTION: ICD-10-CM

## 2018-05-23 DIAGNOSIS — K76.0 FATTY INFILTRATION OF LIVER: ICD-10-CM

## 2018-05-23 DIAGNOSIS — K70.9 ALCOHOLIC LIVER DISEASE: ICD-10-CM

## 2018-05-23 DIAGNOSIS — S91.051A CAT BITE OF ANKLE, RIGHT, INITIAL ENCOUNTER: Primary | ICD-10-CM

## 2018-05-23 DIAGNOSIS — Z00.00 WELL ADULT EXAM: ICD-10-CM

## 2018-05-23 DIAGNOSIS — R16.0 LIVER MASS: ICD-10-CM
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
## 2018-05-23 DIAGNOSIS — D18.03 LIVER HEMANGIOMA: ICD-10-CM

## 2018-05-23 LAB
25(OH)D3+25(OH)D2 SERPL-MCNC: 39 NG/ML
ALBUMIN SERPL BCP-MCNC: 3.4 G/DL
ALP SERPL-CCNC: 113 U/L
ALT SERPL W/O P-5'-P-CCNC: 40 U/L
ANION GAP SERPL CALC-SCNC: 12 MMOL/L
AST SERPL-CCNC: 63 U/L
BASOPHILS # BLD AUTO: 0.04 K/UL
BASOPHILS NFR BLD: 0.8 %
BILIRUB SERPL-MCNC: 0.2 MG/DL
BUN SERPL-MCNC: 8 MG/DL
CALCIUM SERPL-MCNC: 9.7 MG/DL
CHLORIDE SERPL-SCNC: 103 MMOL/L
CHOLEST SERPL-MCNC: 244 MG/DL
CHOLEST/HDLC SERPL: 2.8 {RATIO}
CO2 SERPL-SCNC: 26 MMOL/L
CREAT SERPL-MCNC: 0.6 MG/DL
DIFFERENTIAL METHOD: ABNORMAL
EOSINOPHIL # BLD AUTO: 0.1 K/UL
EOSINOPHIL NFR BLD: 1.1 %
ERYTHROCYTE [DISTWIDTH] IN BLOOD BY AUTOMATED COUNT: 12.9 %
EST. GFR  (AFRICAN AMERICAN): >60 ML/MIN/1.73 M^2
EST. GFR  (NON AFRICAN AMERICAN): >60 ML/MIN/1.73 M^2
ESTIMATED AVG GLUCOSE: 100 MG/DL
GLUCOSE SERPL-MCNC: 97 MG/DL
HBA1C MFR BLD HPLC: 5.1 %
HCT VFR BLD AUTO: 38.1 %
HDLC SERPL-MCNC: 88 MG/DL
HDLC SERPL: 36.1 %
HGB BLD-MCNC: 12.5 G/DL
IMM GRANULOCYTES # BLD AUTO: 0.02 K/UL
IMM GRANULOCYTES NFR BLD AUTO: 0.4 %
LDLC SERPL CALC-MCNC: 137 MG/DL
LYMPHOCYTES # BLD AUTO: 1.6 K/UL
LYMPHOCYTES NFR BLD: 32.6 %
MCH RBC QN AUTO: 35.7 PG
MCHC RBC AUTO-ENTMCNC: 32.8 G/DL
MCV RBC AUTO: 109 FL
MONOCYTES # BLD AUTO: 0.8 K/UL
MONOCYTES NFR BLD: 16.8 %
NEUTROPHILS # BLD AUTO: 2.3 K/UL
NEUTROPHILS NFR BLD: 48.3 %
NONHDLC SERPL-MCNC: 156 MG/DL
NRBC BLD-RTO: 0 /100 WBC
PLATELET # BLD AUTO: 130 K/UL
PMV BLD AUTO: 11.4 FL
POTASSIUM SERPL-SCNC: 3.8 MMOL/L
PROT SERPL-MCNC: 7.3 G/DL
RBC # BLD AUTO: 3.5 M/UL
SODIUM SERPL-SCNC: 141 MMOL/L
T4 FREE SERPL-MCNC: 0.77 NG/DL
TRIGL SERPL-MCNC: 95 MG/DL
TSH SERPL DL<=0.005 MIU/L-ACNC: 1.36 UIU/ML
WBC # BLD AUTO: 4.76 K/UL

## 2018-05-23 PROCEDURE — 82306 VITAMIN D 25 HYDROXY: CPT

## 2018-05-23 PROCEDURE — 36415 COLL VENOUS BLD VENIPUNCTURE: CPT | Mod: PO

## 2018-05-23 PROCEDURE — 84443 ASSAY THYROID STIM HORMONE: CPT

## 2018-05-23 PROCEDURE — 80053 COMPREHEN METABOLIC PANEL: CPT

## 2018-05-23 PROCEDURE — 3008F BODY MASS INDEX DOCD: CPT | Mod: CPTII,S$GLB,, | Performed by: INTERNAL MEDICINE

## 2018-05-23 PROCEDURE — 99213 OFFICE O/P EST LOW 20 MIN: CPT | Mod: S$GLB,,, | Performed by: INTERNAL MEDICINE

## 2018-05-23 PROCEDURE — 80061 LIPID PANEL: CPT

## 2018-05-23 PROCEDURE — 83036 HEMOGLOBIN GLYCOSYLATED A1C: CPT

## 2018-05-23 PROCEDURE — 84439 ASSAY OF FREE THYROXINE: CPT

## 2018-05-23 PROCEDURE — 85025 COMPLETE CBC W/AUTO DIFF WBC: CPT

## 2018-05-23 PROCEDURE — 99999 PR PBB SHADOW E&M-EST. PATIENT-LVL III: CPT | Mod: PBBFAC,,, | Performed by: INTERNAL MEDICINE

## 2018-05-23 RX ORDER — AMOXICILLIN AND CLAVULANATE POTASSIUM 875; 125 MG/1; MG/1
1 TABLET, FILM COATED ORAL 2 TIMES DAILY
Qty: 20 TABLET | Refills: 0 | Status: SHIPPED | OUTPATIENT
Start: 2018-05-23 | End: 2018-06-02

## 2018-05-26 NOTE — PROGRESS NOTES
Subjective:       Patient ID: Carla Aguilar is a 62 y.o. female who presents for cat bite on rt ankle      Animal Bite    The incident occurred more than 2 days ago (patient's cat bit her on the right foot 1 week ago). The incident occurred at home. There is an injury to the right foot. The patient is experiencing no pain. It is unlikely that a foreign body is present. Pertinent negatives include no chest pain, no visual disturbance, no abdominal pain, no nausea, no vomiting, no headaches, no inability to bear weight, no pain when bearing weight, no light-headedness, no tingling, no weakness and no cough. Her tetanus status is UTD (2016 per notes). Recent Medical Care: patient reports sterilizing a needle and used it to break skin and allow pus to drain.      Reports that swelling and pain has improved in the last week but still notices redness.       Review of Systems   Constitutional: Negative for chills, fatigue and fever.   HENT: Negative for congestion and sinus pressure.    Eyes: Negative for redness and visual disturbance.   Respiratory: Negative for cough and shortness of breath.    Cardiovascular: Negative for chest pain, palpitations and leg swelling.   Gastrointestinal: Negative for abdominal pain, diarrhea, nausea and vomiting.   Musculoskeletal: Negative for arthralgias and myalgias.   Skin: Positive for wound (right dorsal foot). Negative for rash.   Neurological: Negative for dizziness, tingling, weakness, light-headedness and headaches.       Objective:      Physical Exam   Constitutional: She is oriented to person, place, and time. Vital signs are normal. She appears well-developed and well-nourished. No distress.   HENT:   Head: Normocephalic and atraumatic.   Right Ear: Hearing and external ear normal.   Left Ear: Hearing and external ear normal.   Nose: Nose normal.   Mouth/Throat: Uvula is midline and mucous membranes are normal.   Eyes: Conjunctivae and lids are normal.   Cardiovascular: Normal  rate, regular rhythm, normal heart sounds and intact distal pulses.    No murmur heard.  Pulmonary/Chest: Effort normal and breath sounds normal. She has no wheezes.   Abdominal: Soft. Bowel sounds are normal. She exhibits no distension. There is no tenderness.   Musculoskeletal: Normal range of motion. She exhibits no edema.        Right ankle: No tenderness. Achilles tendon normal.        Right foot: There is tenderness (near 2 puncture marks on dorsal foot) and swelling. There is normal range of motion.   + redness, warmth and swelling   Neurological: She is alert and oriented to person, place, and time.   Skin: Skin is warm, dry and intact. No rash noted. She is not diaphoretic.   Psychiatric: She has a normal mood and affect.   Vitals reviewed.      Assessment:       1. Cat bite of ankle, right, initial encounter    2. Superficial skin infection    3. Alcoholic liver disease        Plan:       1. Cat bite of ankle, right, initial encounter  - minimal improvement in wound, keep clean and dry  - amoxicillin-clavulanate 875-125mg (AUGMENTIN) 875-125 mg per tablet; Take 1 tablet by mouth 2 (two) times daily.  Dispense: 20 tablet; Refill: 0    2. Superficial skin infection  - amoxicillin-clavulanate 875-125mg (AUGMENTIN) 875-125 mg per tablet; Take 1 tablet by mouth 2 (two) times daily.  Dispense: 20 tablet; Refill: 0    3. Alcoholic liver disease  - monitored by hepatology, stable    Call if no improvement in wound    Suni Junior MD

## 2018-05-29 ENCOUNTER — OFFICE VISIT (OUTPATIENT)
Dept: INTERNAL MEDICINE | Facility: CLINIC | Age: 63
End: 2018-05-29
Payer: COMMERCIAL

## 2018-05-29 VITALS
DIASTOLIC BLOOD PRESSURE: 96 MMHG | HEART RATE: 96 BPM | OXYGEN SATURATION: 95 % | WEIGHT: 160.94 LBS | SYSTOLIC BLOOD PRESSURE: 158 MMHG | TEMPERATURE: 99 F | BODY MASS INDEX: 27.48 KG/M2 | RESPIRATION RATE: 18 BRPM | HEIGHT: 64 IN

## 2018-05-29 DIAGNOSIS — F41.1 GAD (GENERALIZED ANXIETY DISORDER): ICD-10-CM

## 2018-05-29 DIAGNOSIS — Z72.0 TOBACCO ABUSE: ICD-10-CM

## 2018-05-29 DIAGNOSIS — K76.0 FATTY INFILTRATION OF LIVER: ICD-10-CM

## 2018-05-29 DIAGNOSIS — D18.03 LIVER HEMANGIOMA: ICD-10-CM

## 2018-05-29 DIAGNOSIS — F10.10 ALCOHOL ABUSE: ICD-10-CM

## 2018-05-29 DIAGNOSIS — K70.9 ALCOHOLIC LIVER DISEASE: ICD-10-CM

## 2018-05-29 DIAGNOSIS — Z00.00 ANNUAL PHYSICAL EXAM: Primary | ICD-10-CM

## 2018-05-29 PROCEDURE — 99396 PREV VISIT EST AGE 40-64: CPT | Mod: S$GLB,,, | Performed by: FAMILY MEDICINE

## 2018-05-29 PROCEDURE — 99999 PR PBB SHADOW E&M-EST. PATIENT-LVL IV: CPT | Mod: PBBFAC,,, | Performed by: FAMILY MEDICINE

## 2018-05-29 NOTE — PROGRESS NOTES
Subjective:       Patient ID: Carla Aguilar is a 62 y.o. female.    Chief Complaint: Annual Exam    HPI 62-year-old white female patient of Dr. Burton presents to clinic today for annual exam.  She is accompanied by her sister.  Both the patient and sister expressed concern secondary to the patient's alcoholism.  The patient reports drinking approximately 1 bottle of vodka daily.  She has been noted to have alcoholic liver disease in the past.  She was seen by hepatology in 2015 secondary to a liver mass.  Scans were stable but the patient had never returned for follow-up with hepatology.  At this time her liver enzymes continue to be elevated.  She is thrombocytopenic and her MCV is elevated.  I have strongly stressed that the patient see addiction for further assistance with alcohol cessation.  She is defensive throughout the exam.  She initially presents asking for assistance but when I express the assistance necessary she becomes extremely defensive.  She continues to take Paxil secondary to anxiety but believes that the medication is not working secondary to missing a dose secondary to a and reporting withdrawals.  She has a past surgical history significant for partial thyroidectomy and bilateral cataract extraction.  Review of Systems   Constitutional: Negative for appetite change, chills, fatigue and fever.   HENT: Negative for congestion, ear pain, hearing loss, postnasal drip, rhinorrhea, sinus pressure, sore throat and tinnitus.    Eyes: Negative for redness, itching and visual disturbance.   Respiratory: Negative for cough, chest tightness and shortness of breath.    Cardiovascular: Negative for chest pain and palpitations.   Gastrointestinal: Negative for abdominal pain, constipation, diarrhea, nausea and vomiting.   Genitourinary: Negative for decreased urine volume, difficulty urinating, dysuria, frequency, hematuria and urgency.   Musculoskeletal: Negative for back pain, myalgias, neck pain and neck  stiffness.   Skin: Negative for rash.   Neurological: Positive for light-headedness and headaches. Negative for dizziness.   Psychiatric/Behavioral: Positive for sleep disturbance. The patient is nervous/anxious.         Alcohol abuse         Objective:      Physical Exam   Constitutional: She is oriented to person, place, and time. She appears well-developed and well-nourished. No distress.   HENT:   Head: Normocephalic and atraumatic.   Right Ear: External ear normal.   Left Ear: External ear normal.   Nose: Nose normal.   Mouth/Throat: Oropharynx is clear and moist. No oropharyngeal exudate.   Eyes: Conjunctivae and EOM are normal. Pupils are equal, round, and reactive to light. Right eye exhibits no discharge. Left eye exhibits no discharge. No scleral icterus.   Neck: Normal range of motion. Neck supple. No JVD present. No tracheal deviation present. No thyromegaly present.   Cardiovascular: Normal rate, regular rhythm, normal heart sounds and intact distal pulses.  Exam reveals no gallop and no friction rub.    No murmur heard.  Pulmonary/Chest: Effort normal and breath sounds normal. No stridor. No respiratory distress. She has no wheezes. She has no rales.   Abdominal: Soft. Bowel sounds are normal. She exhibits no distension and no mass. There is no tenderness. There is no rebound and no guarding.   Musculoskeletal: Normal range of motion. She exhibits no edema or tenderness.   Lymphadenopathy:     She has no cervical adenopathy.   Neurological: She is alert and oriented to person, place, and time. She displays tremor.   Skin: Skin is warm and dry. No rash noted. She is not diaphoretic. No erythema. No pallor.   Psychiatric: Thought content normal. Her mood appears anxious. Her affect is inappropriate. Her speech is rapid and/or pressured. She is agitated. She expresses impulsivity.   Nursing note and vitals reviewed.      Assessment:       1. Annual physical exam    2. Alcoholic liver disease    3. Fatty  "infiltration of liver    4. Liver hemangioma    5. TIERRA (generalized anxiety disorder)    6. Alcohol abuse    7. Tobacco abuse        Plan:       1.  Labs have been reviewed and explained to the patient.  Labs revealed elevated liver enzymes, thrombocytopenia, and elevated MCV and related to alcoholic liver disease.  2.  Re-refer to hepatology for further evaluation of alcoholic liver disease and liver hemangioma.  3.  Refer to addiction for further assistance with severe alcohol abuse and tobacco abuse.  4.  Return to clinic as needed or follow up with PCP in 6 months.  "This note will not be shared with the patient."  "

## 2018-05-30 ENCOUNTER — DOCUMENTATION ONLY (OUTPATIENT)
Dept: TRANSPLANT | Facility: CLINIC | Age: 63
End: 2018-05-30

## 2018-05-30 NOTE — LETTER
May 30, 2018    Carla Aguilar  [address] [place] [id number]      Dear Carla Aguilar:    Your doctor has referred you to the [facility]. You will be contacted by our office and an initial appointment will then be scheduled for you.    We look forward to seeing you soon. If you have any further questions, please contact us at [phone].       Sincerely,        [facility]   [address] [place] [id number]  [phone]

## 2018-05-30 NOTE — NURSING
Pt records reviewed.   Pt will be referred to Hepatology.    Initial referral received  from the [facility]que.   Referring Provider/diagnosis   Referred To Provider:   Auth Provider: JENS ALVAREZ   Diagnosis: Alcoholic liver disease  Fatty infiltration of liver  Liver hemangioma           Referral letter sent to provider and patient.

## 2018-05-31 ENCOUNTER — TELEPHONE (OUTPATIENT)
Dept: INTERNAL MEDICINE | Facility: CLINIC | Age: 63
End: 2018-05-31

## 2018-05-31 NOTE — TELEPHONE ENCOUNTER
----- Message from Argentina Sun sent at 5/30/2018  6:45 PM CDT -----  Contact: self  [phone]  Pt missed a call and would like the nurse to return their call.        Thank you!

## 2018-05-31 NOTE — TELEPHONE ENCOUNTER
Spoke with pt that I did not call her. She then stated that Oj called her.  Transferring message to oj

## 2018-06-04 ENCOUNTER — TELEPHONE (OUTPATIENT)
Dept: INTERNAL MEDICINE | Facility: CLINIC | Age: 63
End: 2018-06-04

## 2018-06-04 NOTE — TELEPHONE ENCOUNTER
----- Message from Annmarie Posada sent at 6/4/2018  2:07 PM CDT -----  Contact: Pt [phone]  Patient would like to get a renewal on the script for paroxetine (PAXIL) 10 MG tablet,  paroxetine (PAXIL) 20 MG tablet. Patient get two separate Mg, one is for 10 mg and the other is for 20 mg. She said that she would like to get a one year prescription. Patient pharmacy is [facility] [address] [place] [address]. Fax#  [phone].

## 2018-06-07 ENCOUNTER — TELEPHONE (OUTPATIENT)
Dept: HEPATOLOGY | Facility: CLINIC | Age: 63
End: 2018-06-07

## 2018-06-07 NOTE — TELEPHONE ENCOUNTER
MA called patient back, she would like to reschedule her appt but when I told her that the first available we have is not till July she said she will keep the appt.

## 2018-06-07 NOTE — TELEPHONE ENCOUNTER
----- Message from Francois Shah sent at 6/7/2018  3:07 PM CDT -----  Contact: patient   Patient have an appointment schedule for Friday, June 8, 2018 and would like to cancel and reschedule for another date and time.        Please call [phone]        Thanks!

## 2018-06-08 ENCOUNTER — OFFICE VISIT (OUTPATIENT)
Dept: HEPATOLOGY | Facility: CLINIC | Age: 63
End: 2018-06-08
Payer: COMMERCIAL

## 2018-06-08 VITALS
SYSTOLIC BLOOD PRESSURE: 189 MMHG | RESPIRATION RATE: 18 BRPM | HEART RATE: 105 BPM | OXYGEN SATURATION: 97 % | WEIGHT: 160.06 LBS | DIASTOLIC BLOOD PRESSURE: 91 MMHG | TEMPERATURE: 99 F | BODY MASS INDEX: 27.33 KG/M2 | HEIGHT: 64 IN

## 2018-06-08 DIAGNOSIS — K70.9 ALCOHOLIC LIVER DISEASE: Primary | ICD-10-CM

## 2018-06-08 DIAGNOSIS — G89.29 CHRONIC BILATERAL LOW BACK PAIN WITHOUT SCIATICA: ICD-10-CM

## 2018-06-08 DIAGNOSIS — M54.50 CHRONIC BILATERAL LOW BACK PAIN WITHOUT SCIATICA: ICD-10-CM

## 2018-06-08 PROCEDURE — 3008F BODY MASS INDEX DOCD: CPT | Mod: CPTII,S$GLB,, | Performed by: INTERNAL MEDICINE

## 2018-06-08 PROCEDURE — 99205 OFFICE O/P NEW HI 60 MIN: CPT | Mod: S$GLB,,, | Performed by: INTERNAL MEDICINE

## 2018-06-08 PROCEDURE — 99999 PR PBB SHADOW E&M-EST. PATIENT-LVL V: CPT | Mod: PBBFAC,,, | Performed by: INTERNAL MEDICINE

## 2018-06-08 NOTE — PATIENT INSTRUCTIONS
We will obtain fibroscan with next office visit    Referral to addiction psychiatry for assistance with anxiety and alcohol use.    Referral to orthopedic surgery for further management of chronic low back pain.  You may use up to 2000mg of tylenol in a daily; use may use ibuprofen 800mg daily as needed.    You may schedule labs at your convenience.    Return to clinic in 4-5 months.

## 2018-06-08 NOTE — PROGRESS NOTES
Hepatology Consult Note    Referring provider: Dr. Justin Rogers    Chief complaint:   Chief Complaint   Patient presents with    Fatty Liver       HPI:  Carla Aguilar is a 62 y.o. female that presents to hepatology clinic for consultation of fatty liver.  She is accompanied by sister.       History of elevated liver tests: since 2015, previously seen in hepatology clinic by Dr. Archuleta in 2015.  Serologic chiu unrevealing and concern for alcohol related liver disease   Risk factors for GUSMAN: over ideal body weight  Medication list reviewed.  Concerning medications: none   OTCs, Herbal Medications: none   Family history: father with chronic liver disease; both parents with excessive alcohol intake   Alcohol use: patient reports 1 pint of liquor daily   Symptoms of chronic liver disease: none   Recent viral syndrome: N/A     Patient Active Problem List   Diagnosis    Alcoholic liver disease    Liver mass    Liver hemangioma    Fatty infiltration of liver    TIERRA (generalized anxiety disorder)    Nuclear sclerosis    Post-operative state    Nuclear sclerotic cataract of left eye    Alcohol abuse    Tobacco abuse       Past Medical History:   Diagnosis Date    Anxiety     Back pain     Gallstones     Hyperlipidemia     Thrombocytopenia        Past Surgical History:   Procedure Laterality Date    CATARACT EXTRACTION W/  INTRAOCULAR LENS IMPLANT Right 08/28/2017    Dr. Barraza    CATARACT EXTRACTION W/  INTRAOCULAR LENS IMPLANT Left 09/18/2017    Dr Barraza     THYROIDECTOMY, PARTIAL         Family History   Problem Relation Age of Onset    Hyperlipidemia Father     Stroke Father     Cataracts Mother     Hypertension Sister     Diabetes Paternal Uncle     Cancer Maternal Grandmother     Cancer Paternal Grandmother     Colon cancer Neg Hx     Ovarian cancer Neg Hx     Breast cancer Neg Hx     Blindness Neg Hx     Amblyopia Neg Hx     Glaucoma Neg Hx     Macular degeneration Neg Hx      Retinal detachment Neg Hx     Strabismus Neg Hx     Thyroid disease Neg Hx        Social History     Social History    Marital status: Single     Spouse name: N/A    Number of children: N/A    Years of education: N/A     Occupational History    nails       Social History Main Topics    Smoking status: Current Every Day Smoker     Packs/day: 0.50     Types: Cigars    Smokeless tobacco: Current User      Comment: about 5 cigarettes  per day    Alcohol use Yes      Comment: daily; wine or martinis    Drug use: No    Sexual activity: Not Asked     Other Topics Concern    None     Social History Narrative    None   one pint daily    Current Outpatient Prescriptions   Medication Sig Dispense Refill    ibuprofen (ADVIL,MOTRIN) 200 MG tablet Take 200 mg by mouth every 6 (six) hours as needed for Pain.      KRILL OIL ORAL Take by mouth once daily.       multivitamin capsule Take 1 capsule by mouth once daily.      paroxetine (PAXIL) 10 MG tablet Take 1 tablet (10 mg total) by mouth once daily. 30 tablet 11    paroxetine (PAXIL) 20 MG tablet Take 1 tablet (20 mg total) by mouth once daily. 30 tablet 11    ranitidine (ZANTAC) 150 MG capsule Take 150 mg by mouth 2 (two) times daily.       No current facility-administered medications for this visit.        Review of patient's allergies indicates:  No Known Allergies    Review of Systems   Constitutional: Negative for chills, fever, malaise/fatigue and weight loss.   Eyes: Negative.    Respiratory: Negative for cough and shortness of breath.    Cardiovascular: Negative for chest pain and leg swelling.   Gastrointestinal: Negative for abdominal pain, heartburn, nausea and vomiting.   Musculoskeletal: Negative for joint pain and myalgias.   Skin: Negative for itching and rash.   Neurological: Negative for dizziness, focal weakness and headaches.   Endo/Heme/Allergies: Does not bruise/bleed easily.   Psychiatric/Behavioral: Negative for depression. The patient is  "nervous/anxious.        Vitals:    06/08/18 1520   BP: (!) 189/91   Pulse: 105   Resp: 18   Temp: 99.4 °F (37.4 °C)   TempSrc: Oral   SpO2: 97%   Weight: 72.6 kg (160 lb 0.9 oz)   Height: 5' 4" (1.626 m)       Physical Exam   Constitutional: She is oriented to person, place, and time. She appears well-developed and well-nourished. No distress.   HENT:   Head: Normocephalic and atraumatic.   Mouth/Throat: Oropharynx is clear and moist.   Eyes: EOM are normal. Pupils are equal, round, and reactive to light. No scleral icterus.   Neck: Normal range of motion. Neck supple. No thyromegaly present.   Cardiovascular: Normal rate, regular rhythm and normal heart sounds.  Exam reveals no gallop and no friction rub.    No murmur heard.  Pulmonary/Chest: Effort normal. No respiratory distress. She has no wheezes. She has no rales.   Abdominal: Soft. Bowel sounds are normal. She exhibits no distension. There is no tenderness.   Musculoskeletal: Normal range of motion. She exhibits no edema.   Lymphadenopathy:     She has no cervical adenopathy.   Neurological: She is alert and oriented to person, place, and time. No cranial nerve deficit.   Skin: Skin is warm and dry. No rash noted.   Psychiatric: She has a normal mood and affect. Her behavior is normal.   Vitals reviewed.      Lab Results   Component Value Date    ALT 40 05/23/2018    AST 63 (H) 05/23/2018    ALKPHOS 113 05/23/2018    BILITOT 0.2 05/23/2018       Lab Results   Component Value Date    WBC 4.76 05/23/2018    HGB 12.5 05/23/2018    HCT 38.1 05/23/2018     (H) 05/23/2018     (L) 05/23/2018       Lab Results   Component Value Date     05/23/2018    K 3.8 05/23/2018     05/23/2018    CO2 26 05/23/2018    BUN 8 05/23/2018    CREATININE 0.6 05/23/2018    CALCIUM 9.7 05/23/2018    ANIONGAP 12 05/23/2018    ESTGFRAFRICA >60.0 05/23/2018    EGFRNONAA >60.0 05/23/2018       Imaging: EMR and external imaging available reviewed     Assessment:  62 " y.o. female presenting with elevated liver tests most consistent with alcohol related liver disease    Plan:  *  Alcohol use:  Discussed the importance of achieving complete alcohol abstinence given the concern for alcohol related liver disease.  The patient has significant concern for her ability to achieve cessation.  Reports physical dependence and withdrawal symptoms in the past.  She is open to referral to addiction psychiatry and prefers a female provider.  Referral placed and request for Dr. Fernandez.  Discussed the risk for progressive liver disease without cessation.    *  Plan for fibroscan with return visit for fibrosis staging, concern for advanced fibrosis or cirrhosis based on thrombocytopenia and long standing alcohol use    *  Back pain:  Patient with chronic back pain and previously seen by orthopedics.  Note suggestions options for steroid injections as well as surgery.  While she is not interested in surgery at this time, she would like to consider steroid injection. Patient feels that uncontrolled back pain contributes to alcohol consumption and would like to seek other treatment as she has failed conservative therapy with rest, tylenol and NSAIDS.  Reviewed safe use of tylenol 2000mg and sparing use of NSAID given liver disease.    Chronic liver disease:  Serologic w/u to exclude other etiologies of CLD    RTC in 4-5 months

## 2018-06-08 NOTE — LETTER
June 14, 2018        Justin Rogers MD  [address] [facility] [place] [address]             [facility] - Hepatology  [address]  [place] [address]  Phone: [phone]  Fax: [phone]   Patient: Carla Aguilar   MR Number: [id number]   YOB: 1955   Date of Visit: 6/8/2018       Dear Dr. oRgers:    Thank you for referring Carla Aguilar to me for evaluation. Below are the relevant portions of my assessment and plan of care.            If you have questions, please do not hesitate to call me. I look forward to following Carla along with you.    Sincerely,      Sindi Samaniego MD           CC  No Recipients

## 2018-06-11 DIAGNOSIS — M54.50 LUMBAR SPINE PAIN: Primary | ICD-10-CM

## 2018-06-14 ENCOUNTER — TELEPHONE (OUTPATIENT)
Dept: OBSTETRICS AND GYNECOLOGY | Facility: CLINIC | Age: 63
End: 2018-06-14

## 2018-06-14 DIAGNOSIS — Z12.31 ENCOUNTER FOR SCREENING MAMMOGRAM FOR BREAST CANCER: Primary | ICD-10-CM

## 2018-06-14 NOTE — TELEPHONE ENCOUNTER
----- Message from Castro Olson sent at 6/14/2018  2:55 PM CDT -----  Contact: pt            Name of Who is Calling: pt      What is the request in detail: mmg order request      Can the clinic reply by [facility]: no      What [facility] Back [facility]: [phone]

## 2018-06-14 NOTE — TELEPHONE ENCOUNTER
Returning patients call. Order for mammogram placed. [facility] appointment scheduled for 7/19@[facility]. Verbalized understanding.

## 2018-06-18 ENCOUNTER — TELEPHONE (OUTPATIENT)
Dept: ORTHOPEDICS | Facility: CLINIC | Age: 63
End: 2018-06-18

## 2018-06-18 NOTE — TELEPHONE ENCOUNTER
----- Message from Nida Huerta sent at 6/16/2018 10:16 AM CDT -----  Contact: self  Pt is calling in regards to rescheduling her appt to a later time on 06/26.    Pt would like a call back at  [phone].    Thank you

## 2018-06-21 ENCOUNTER — HOSPITAL ENCOUNTER (OUTPATIENT)
Dept: RADIOLOGY | Facility: HOSPITAL | Age: 63
Discharge: HOME | End: 2018-06-21
Attending: OBSTETRICS & GYNECOLOGY
Payer: COMMERCIAL

## 2018-06-21 DIAGNOSIS — Z12.31 ENCOUNTER FOR SCREENING MAMMOGRAM FOR BREAST CANCER: ICD-10-CM

## 2018-06-21 PROCEDURE — 77067 SCR MAMMO BI INCL CAD: CPT | Mod: 26,,, | Performed by: RADIOLOGY

## 2018-06-21 PROCEDURE — 77063 BREAST TOMOSYNTHESIS BI: CPT | Mod: 26,,, | Performed by: RADIOLOGY

## 2018-06-21 PROCEDURE — 77067 SCR MAMMO BI INCL CAD: CPT | Mod: TC,PO

## 2018-06-26 ENCOUNTER — HOSPITAL ENCOUNTER (OUTPATIENT)
Dept: RADIOLOGY | Facility: HOSPITAL | Age: 63
Discharge: HOME | End: 2018-06-26
Attending: PHYSICIAN ASSISTANT
Payer: COMMERCIAL

## 2018-06-26 DIAGNOSIS — M54.50 LUMBAR SPINE PAIN: ICD-10-CM

## 2018-06-26 PROCEDURE — 72120 X-RAY BEND ONLY L-S SPINE: CPT | Mod: 26,,, | Performed by: RADIOLOGY

## 2018-06-26 PROCEDURE — 72100 X-RAY EXAM L-S SPINE 2/3 VWS: CPT | Mod: 26,,, | Performed by: RADIOLOGY

## 2018-06-26 PROCEDURE — 72100 X-RAY EXAM L-S SPINE 2/3 VWS: CPT | Mod: TC

## 2018-06-28 ENCOUNTER — TELEPHONE (OUTPATIENT)
Dept: HEPATOLOGY | Facility: CLINIC | Age: 63
End: 2018-06-28

## 2018-06-28 ENCOUNTER — TELEPHONE (OUTPATIENT)
Dept: INTERNAL MEDICINE | Facility: CLINIC | Age: 63
End: 2018-06-28

## 2018-06-28 NOTE — TELEPHONE ENCOUNTER
Spoke with pt re: stool test kit. Pt can come stop by &  a kit & I will explain to her the process.  Pt verbalized understanding.  Pt will stop by tomorrow afternoon

## 2018-06-28 NOTE — TELEPHONE ENCOUNTER
----- Message from Jennifer Paula sent at 6/28/2018  2:13 PM CDT -----  Contact: Patient [phone]  The patient said you forgot to give her the stool test kit to collect at home.    Please call and advise.    Thank you

## 2018-06-29 ENCOUNTER — TELEPHONE (OUTPATIENT)
Dept: OBSTETRICS AND GYNECOLOGY | Facility: CLINIC | Age: 63
End: 2018-06-29

## 2018-06-29 NOTE — TELEPHONE ENCOUNTER
----- Message from Dana Almeida sent at 6/29/2018 11:02 AM CDT -----            Name of Who is Calling: SERGIO BLAKE [[id number]]    What is the request in detail:pt is returning a call from your office  Can the clinic reply by [facility]: no    [facility]: [phone]

## 2018-06-29 NOTE — TELEPHONE ENCOUNTER
----- Message from Shey Finch MD sent at 6/28/2018 11:27 PM CDT -----  Normal Mammogram  F/U in one year.

## 2018-06-29 NOTE — TELEPHONE ENCOUNTER
Called patient to discuss Normal Mammogram . No answer . Left voicemail letting patient know to give our office a call back.

## 2018-07-02 ENCOUNTER — TELEPHONE (OUTPATIENT)
Dept: OBSTETRICS AND GYNECOLOGY | Facility: CLINIC | Age: 63
End: 2018-07-02

## 2018-07-02 NOTE — TELEPHONE ENCOUNTER
----- Message from Nazanin Chavez sent at 7/2/2018 12:01 PM CDT -----  Contact: self  Pt is returning a phone call to discuss her results. The pt can be reached at [phone]

## 2018-07-17 ENCOUNTER — OFFICE VISIT (OUTPATIENT)
Dept: SPINE | Facility: CLINIC | Age: 63
End: 2018-07-17
Payer: COMMERCIAL

## 2018-07-17 VITALS — HEIGHT: 64 IN | WEIGHT: 160.06 LBS | BODY MASS INDEX: 27.33 KG/M2

## 2018-07-17 DIAGNOSIS — M43.10 SPONDYLOLISTHESIS, UNSPECIFIED SPINAL REGION: ICD-10-CM

## 2018-07-17 DIAGNOSIS — M54.16 LUMBAR RADICULOPATHY: Primary | ICD-10-CM

## 2018-07-17 PROCEDURE — 99999 PR PBB SHADOW E&M-EST. PATIENT-LVL III: CPT | Mod: PBBFAC,,, | Performed by: PHYSICIAN ASSISTANT

## 2018-07-17 PROCEDURE — 99214 OFFICE O/P EST MOD 30 MIN: CPT | Mod: S$GLB,,, | Performed by: PHYSICIAN ASSISTANT

## 2018-07-17 PROCEDURE — 3008F BODY MASS INDEX DOCD: CPT | Mod: CPTII,S$GLB,, | Performed by: PHYSICIAN ASSISTANT

## 2018-07-17 RX ORDER — DIAZEPAM 2 MG/1
2 TABLET ORAL
Qty: 2 TABLET | Refills: 0 | Status: SHIPPED | OUTPATIENT
Start: 2018-07-17 | End: 2018-09-21

## 2018-07-17 NOTE — PROGRESS NOTES
DATE: 7/17/2018  PATIENT: Carla Aguilar    Supervising Physician: Suleman Basilio M.D.    CHIEF COMPLAINT: back and bilateral leg pain    HISTORY:  Carla Aguilar is a 62 y.o. female here for initial evaluation of low back and bilateral posterior leg pain (Back - 8, Leg - 8).  The pain has been present for many years but has progressively worsened over last 3 years. The patient describes the pain as aching and sharp.  The pain is worse with walking and improved by sitting or leaning forward. There is associated numbness and tingling. There is no subjective weakness. Prior treatments have included ibuprofen, physical therapy, and water therapy, but no ESIs or surgery.    The patient denies myelopathic symptoms such as handwriting changes or difficulty with buttons/coins/keys. Denies perineal paresthesias, bowel/bladder dysfunction.    PAST MEDICAL/SURGICAL HISTORY:  Past Medical History:   Diagnosis Date    Anxiety     Back pain     Gallstones     Hyperlipidemia     Thrombocytopenia      Past Surgical History:   Procedure Laterality Date    CATARACT EXTRACTION W/  INTRAOCULAR LENS IMPLANT Right 08/28/2017    Dr. Barraza    CATARACT EXTRACTION W/  INTRAOCULAR LENS IMPLANT Left 09/18/2017    Dr Barraza     THYROIDECTOMY, PARTIAL         Medications:   Current Outpatient Prescriptions on File Prior to Visit   Medication Sig Dispense Refill    ibuprofen (ADVIL,MOTRIN) 200 MG tablet Take 200 mg by mouth every 6 (six) hours as needed for Pain.      KRILL OIL ORAL Take by mouth once daily.       multivitamin capsule Take 1 capsule by mouth once daily.      paroxetine (PAXIL) 10 MG tablet Take 1 tablet (10 mg total) by mouth once daily. 30 tablet 11    paroxetine (PAXIL) 20 MG tablet Take 1 tablet (20 mg total) by mouth once daily. 30 tablet 11    ranitidine (ZANTAC) 150 MG capsule Take 150 mg by mouth 2 (two) times daily.       No current facility-administered medications on file prior to visit.        Social  "History:   Social History     Social History    Marital status: Single     Spouse name: N/A    Number of children: N/A    Years of education: N/A     Occupational History    nails       Social History Main Topics    Smoking status: Current Every Day Smoker     Packs/day: 0.50     Types: Cigars    Smokeless tobacco: Current User      Comment: about 5 cigarettes  per day    Alcohol use Yes      Comment: daily; wine or martinis    Drug use: No    Sexual activity: Not on file     Other Topics Concern    Not on file     Social History Narrative    No narrative on file       REVIEW OF SYSTEMS:  Constitution: Negative. Negative for chills, fever and night sweats.   Cardiovascular: Negative for chest pain and syncope.   Respiratory: Negative for cough and shortness of breath.   Gastrointestinal: See HPI. Negative for nausea/vomiting. Negative for abdominal pain.  Genitourinary: See HPI. Negative for discoloration or dysuria.  Skin: Negative for dry skin, itching and rash.   Hematologic/Lymphatic: Negative for bleeding problem. Does not bruise/bleed easily.   Musculoskeletal: Negative for falls and muscle weakness.   Neurological: See HPI. No seizures.   Endocrine: Negative for polydipsia, polyphagia and polyuria.   Allergic/Immunologic: Negative for hives and persistent infections.     EXAM:  Ht 5' 4" (1.626 m)   Wt 72.6 kg (160 lb 0.9 oz)   BMI 27.47 kg/m²     General: The patient is a very pleasant 62 y.o. female in no apparent distress, the patient is oriented to person, place and time.  Psych: Normal mood and affect  HEENT: Vision grossly intact, hearing intact to the spoken word.  Lungs: Respirations unlabored.  Gait: Antalgic station and gait, no difficulty with toe or heel walk.   Skin: Dorsal lumbar skin negative for rashes, lesions, hairy patches and surgical scars. There is mild lumbar tenderness to palpation.  Range of motion: Lumbar range of motion is acceptable.  Spinal Balance: Global saggital and " coronal spinal balance acceptable, not significant for scoliosis and kyphosis.  Musculoskeletal: No pain with the range of motion of the bilateral hips. No trochanteric tenderness to palpation.  Vascular: Bilateral lower extremities warm and well perfused, dorsalis pedis pulses 2+ bilaterally.  Neurological: Normal strength and tone in all major motor groups in the bilateral lower extremities. Normal sensation to light touch in the L2-S1 dermatomes bilaterally.  Deep tendon reflexes symmetric 2+ in the bilateral lower extremities.  Negative Babinski bilaterally. Straight leg raise positive bilaterally, reproduces back pain.    IMAGING:      Today I personally reviewed AP, Lat and Flex/Ex  upright L-spine films that demonstrate grade I anterolisthesis of L4/5.       Body mass index is 27.47 kg/m².    Hemoglobin A1C   Date Value Ref Range Status   05/23/2018 5.1 4.0 - 5.6 % Final     Comment:     According to ADA guidelines, hemoglobin A1c <7.0% represents  optimal control in non-pregnant diabetic patients. Different  metrics may apply to specific patient populations.   Standards of Medical Care in Diabetes-2016.  For the purpose of screening for the presence of diabetes:  <5.7%     Consistent with the absence of diabetes  5.7-6.4%  Consistent with increasing risk for diabetes   (prediabetes)  >or=6.5%  Consistent with diabetes  Currently, no consensus exists for use of hemoglobin A1c  for diagnosis of diabetes for children.  This Hemoglobin A1c assay has significant interference with fetal   hemoglobin   (HbF). The results are invalid for patients with abnormal amounts of   HbF,   including those with known Hereditary Persistence   of Fetal Hemoglobin. Heterozygous hemoglobin variants (HbAS, HbAC,   HbAD, HbAE, HbA2) do not significantly interfere with this assay;   however, presence of multiple variants in a sample may impact the %   interference.             ASSESSMENT/PLAN:    Carla was seen today for back  pain.    Diagnoses and all orders for this visit:    Lumbar radiculopathy  -     MRI Lumbar Spine Without Contrast; Future    Spondylolisthesis, unspecified spinal region  -     MRI Lumbar Spine Without Contrast; Future    Other orders  -     diazePAM (VALIUM) 2 MG tablet; Take 1 tablet (2 mg total) by mouth On call Procedure for Anxiety (take 1 tab 30 min prior to procedure.  may take 2nd if needed).      MRI lumbar spine.  Follow up after the MRI to discuss results and further treatment including ESIs.       Follow-up if symptoms worsen or fail to improve.

## 2018-07-17 NOTE — LETTER
July 17, 2018      Sindi Samaniego MD  [address]  [facility] [address]           [facility]  [address]  [facility] [address]  Phone: [phone]  Fax: [phone]          Patient: Carla Aguilar   MR Number: [id number]   YOB: 1955   Date of Visit: 7/17/2018       Dear Dr. Sindi Samaniego:    Thank you for referring Carla Aguilar to me for evaluation. Attached you will find relevant portions of my assessment and plan of care.    If you have questions, please do not hesitate to call me. I look forward to following Carla Aguilar along with you.    Sincerely,    Tiffany Fischer PA-C    Enclosure  CC:  No Recipients    If you would like to receive this communication electronically, please contact [email or web address] or [phone] to request more information on [employer] access.    For providers and/or their staff who would like to refer a patient to [facility], please contact us through our one-stop-shop provider referral line, [facility], at [phone].    If you feel you have received this communication in error or would no longer like to receive these types of communications, please e-mail [email or web address]

## 2018-07-19 ENCOUNTER — OFFICE VISIT (OUTPATIENT)
Dept: OBSTETRICS AND GYNECOLOGY | Facility: CLINIC | Age: 63
End: 2018-07-19
Payer: COMMERCIAL

## 2018-07-19 VITALS
WEIGHT: 158.75 LBS | HEIGHT: 64 IN | BODY MASS INDEX: 27.1 KG/M2 | SYSTOLIC BLOOD PRESSURE: 160 MMHG | DIASTOLIC BLOOD PRESSURE: 80 MMHG

## 2018-07-19 DIAGNOSIS — Z01.419 ENCOUNTER FOR GYNECOLOGICAL EXAMINATION WITHOUT ABNORMAL FINDING: Primary | ICD-10-CM

## 2018-07-19 DIAGNOSIS — N95.2 VAGINAL ATROPHY: ICD-10-CM

## 2018-07-19 PROCEDURE — 88175 CYTOPATH C/V AUTO FLUID REDO: CPT

## 2018-07-19 PROCEDURE — 99386 PREV VISIT NEW AGE 40-64: CPT | Mod: S$GLB,,, | Performed by: OBSTETRICS & GYNECOLOGY

## 2018-07-19 PROCEDURE — 99999 PR PBB SHADOW E&M-EST. PATIENT-LVL III: CPT | Mod: PBBFAC,,, | Performed by: OBSTETRICS & GYNECOLOGY

## 2018-07-19 NOTE — PROGRESS NOTES
"CC: Well woman exam    Carla Aguilar is a 62 y.o. female  presents for a well woman exam.  She has no GYN  issues, problems, or complaints.      Past Medical History:   Diagnosis Date    Anxiety     Back pain     Gallstones     Hyperlipidemia     Thrombocytopenia        Past Surgical History:   Procedure Laterality Date    CATARACT EXTRACTION W/  INTRAOCULAR LENS IMPLANT Right 2017    Dr. Barraza    CATARACT EXTRACTION W/  INTRAOCULAR LENS IMPLANT Left 2017    Dr Barraza     THYROIDECTOMY, PARTIAL         OB History    Para Term  AB Living   0 0 0 0 0 0   SAB TAB Ectopic Multiple Live Births   0 0 0 0               Family History   Problem Relation Age of Onset    Hyperlipidemia Father     Stroke Father     Cataracts Mother     Hypertension Sister     Diabetes Paternal Uncle     Cancer Maternal Grandmother     Cancer Paternal Grandmother     Colon cancer Neg Hx     Ovarian cancer Neg Hx     Breast cancer Neg Hx     Blindness Neg Hx     Amblyopia Neg Hx     Glaucoma Neg Hx     Macular degeneration Neg Hx     Retinal detachment Neg Hx     Strabismus Neg Hx     Thyroid disease Neg Hx        Social History   Substance Use Topics    Smoking status: Current Every Day Smoker     Packs/day: 0.50     Types: Cigars    Smokeless tobacco: Current User      Comment: about 5 cigarettes  per day    Alcohol use Yes      Comment: daily; wine or martinis       BP (!) 160/80   Ht 5' 4" (1.626 m)   Wt 72 kg (158 lb 11.7 oz)   BMI 27.25 kg/m²     ROS:  GENERAL: Denies weight gain or weight loss. Feeling well overall.   SKIN: Denies rash or lesions.   HEAD: Denies head injury or headache.   NODES: Denies enlarged lymph nodes.   CHEST: Denies chest pain or shortness of breath.   CARDIOVASCULAR: Denies palpitations or left sided chest pain.   ABDOMEN: No abdominal pain, constipation, diarrhea, nausea, vomiting or rectal bleeding.   URINARY: No frequency, dysuria, hematuria, or " burning on urination.  REPRODUCTIVE: See HPI.   BREASTS: The patient performs breast self-examination and denies pain, lumps, or nipple discharge.   HEMATOLOGIC: No easy bruisability or excessive bleeding.  MUSCULOSKELETAL: Denies joint pain or swelling.   NEUROLOGIC: Denies syncope or weakness.   PSYCHIATRIC: Denies depression, anxiety or mood swings.    Physical Exam:    APPEARANCE: Well nourished, well developed, in no acute distress.  AFFECT: WNL, alert and oriented x 3  SKIN: No acne or hirsutism  NECK: Neck symmetric without masses or thyromegaly  NODES: No inguinal, cervical, axillary, or femoral lymph node enlargement  CHEST: Good respiratory effect  ABDOMEN: Soft.  No tenderness or masses.  No hepatosplenomegaly.  No hernias.  BREASTS: Symmetrical, no skin changes or visible lesions.  No palpable masses, nipple discharge bilaterally.  PELVIC: ATROPHIC external genitalia without lesions.  Normal hair distribution.  Adequate perineal body, normal urethral meatus.  Vagina moist and well rugated without lesions or discharge.  Cervix pink, without lesions, discharge or tenderness.  No significant cystocele or rectocele.  Bimanual exam shows uterus to be normal size, regular, mobile and nontender.  Adnexa without masses or tenderness.    EXTREMITIES: No edema.      ASSESSMENT AND PLAN  1. Encounter for gynecological examination without abnormal finding  Liquid-based pap smear, screening   2. Vaginal atrophy       MMG up to date    Patient was counseled today on A.C.S. Pap guidelines and recommendations for yearly pelvic exams, mammograms and monthly self breast exams; to see her PCP for other health maintenance.     Follow-up in about 1 year (around 7/19/2019).

## 2018-07-31 ENCOUNTER — HOSPITAL ENCOUNTER (OUTPATIENT)
Dept: RADIOLOGY | Facility: HOSPITAL | Age: 63
Discharge: HOME | End: 2018-07-31
Attending: PHYSICIAN ASSISTANT
Payer: COMMERCIAL

## 2018-07-31 DIAGNOSIS — M54.16 LUMBAR RADICULOPATHY: ICD-10-CM

## 2018-07-31 DIAGNOSIS — M43.10 SPONDYLOLISTHESIS, UNSPECIFIED SPINAL REGION: ICD-10-CM

## 2018-07-31 PROCEDURE — 72148 MRI LUMBAR SPINE W/O DYE: CPT | Mod: TC

## 2018-07-31 PROCEDURE — 72148 MRI LUMBAR SPINE W/O DYE: CPT | Mod: 26,,, | Performed by: RADIOLOGY

## 2018-08-15 ENCOUNTER — OFFICE VISIT (OUTPATIENT)
Dept: ORTHOPEDICS | Facility: CLINIC | Age: 63
End: 2018-08-15
Payer: COMMERCIAL

## 2018-08-15 VITALS
WEIGHT: 158.75 LBS | SYSTOLIC BLOOD PRESSURE: 186 MMHG | BODY MASS INDEX: 27.1 KG/M2 | HEIGHT: 64 IN | HEART RATE: 97 BPM | DIASTOLIC BLOOD PRESSURE: 94 MMHG

## 2018-08-15 DIAGNOSIS — M43.10 SPONDYLOLISTHESIS, UNSPECIFIED SPINAL REGION: Primary | ICD-10-CM

## 2018-08-15 DIAGNOSIS — M48.062 SPINAL STENOSIS OF LUMBAR REGION WITH NEUROGENIC CLAUDICATION: ICD-10-CM

## 2018-08-15 PROCEDURE — 99999 PR PBB SHADOW E&M-EST. PATIENT-LVL III: CPT | Mod: PBBFAC,,, | Performed by: PHYSICIAN ASSISTANT

## 2018-08-15 PROCEDURE — 99213 OFFICE O/P EST LOW 20 MIN: CPT | Mod: S$GLB,,, | Performed by: PHYSICIAN ASSISTANT

## 2018-08-15 PROCEDURE — 3008F BODY MASS INDEX DOCD: CPT | Mod: CPTII,S$GLB,, | Performed by: PHYSICIAN ASSISTANT

## 2018-08-15 NOTE — H&P (VIEW-ONLY)
"DATE: 8/15/2018  PATIENT: Carla Aguilar    Attending Physician: Suleman Basilio M.D.    HISTORY:  Carla Aguilar is a 62 y.o. female who returns to me today for MRI results.  She was last seen by me 7/17/2018.  Today she is doing well but notes she is stressed today because her car broke down in the parking garage.  She continues to have low back and bilateral leg pain.    The Patient denies myelopathic symptoms such as handwriting changes or difficulty with buttons/coins/keys. Denies perineal paresthesias, bowel/bladder dysfunction.    PMH/PSH/FamHx/SocHx:  Unchanged from prior visit    ROS:  REVIEW OF SYSTEMS:  Constitution: Negative. Negative for chills, fever and night sweats.   HENT: Negative for congestion and headaches.    Eyes: Negative for blurred vision, left vision loss and right vision loss.   Cardiovascular: Negative for chest pain and syncope.   Respiratory: Negative for cough and shortness of breath.    Endocrine: Negative for polydipsia, polyphagia and polyuria.   Hematologic/Lymphatic: Negative for bleeding problem. Does not bruise/bleed easily.   Skin: Negative for dry skin, itching and rash.   Musculoskeletal: Negative for falls and muscle weakness.   Gastrointestinal: Negative for abdominal pain and bowel incontinence.   Allergic/Immunologic: Negative for hives and persistent infections.  Genitourinary: Negative for urinary retention/incontinence and nocturia.   Neurological: negative for disturbances in coordination, no myelopathic symptoms such as handwriting changes or difficulty with buttons, coins, keys or small objects. No loss of balance and seizures.   Psychiatric/Behavioral: Negative for depression. The patient does not have insomnia.   Denies perineal paresthesias, bowel or bladder incontinence    EXAM:  BP (!) 186/94 (BP Location: Right arm, Patient Position: Sitting, BP Method: Medium (Automatic))   Pulse 97   Ht 5' 4" (1.626 m)   Wt 72 kg (158 lb 11.7 oz)   BMI 27.25 kg/m² "     Physical exam stable.  Neuro exam stable.       IMAGING:  No new imaging today.    Today I personally re- reviewed AP, Lat and Flex/Ex  upright L-spine that demonstrate grade I anterolisthesis of L4/5.    MRI lumbar spine demonstrates moderate spinal stenosis at L4/5.       Body mass index is 27.25 kg/m².    Hemoglobin A1C   Date Value Ref Range Status   05/23/2018 5.1 4.0 - 5.6 % Final     Comment:     According to ADA guidelines, hemoglobin A1c <7.0% represents  optimal control in non-pregnant diabetic patients. Different  metrics may apply to specific patient populations.   Standards of Medical Care in Diabetes-2016.  For the purpose of screening for the presence of diabetes:  <5.7%     Consistent with the absence of diabetes  5.7-6.4%  Consistent with increasing risk for diabetes   (prediabetes)  >or=6.5%  Consistent with diabetes  Currently, no consensus exists for use of hemoglobin A1c  for diagnosis of diabetes for children.  This Hemoglobin A1c assay has significant interference with fetal   hemoglobin   (HbF). The results are invalid for patients with abnormal amounts of   HbF,   including those with known Hereditary Persistence   of Fetal Hemoglobin. Heterozygous hemoglobin variants (HbAS, HbAC,   HbAD, HbAE, HbA2) do not significantly interfere with this assay;   however, presence of multiple variants in a sample may impact the %   interference.           ASSESSMENT/PLAN:    Diagnoses and all orders for this visit:    Spondylolisthesis, unspecified spinal region    Spinal stenosis of lumbar region with neurogenic claudication        Plan for ILESI at L4/5 with Dr. Rosas.  Follow up 2 weeks after injection.       Follow-up if symptoms worsen or fail to improve.

## 2018-08-16 NOTE — PROGRESS NOTES
"""Use artificial tears in both eyes 2-4 times a day (Systane Balance or Refresh Advanced) HPI     DLS: 5/10/2016  Pt va in the right eye has decreased all distances with glasses. Pt states   everything is blurry when closing the left eye. Occasional floaters   Denies flashes   +Tearing     Systane ou TID/PRN        Last edited by Billie Zepeda on 5/11/2017  3:14 PM.     ROS     Positive for: Eyes    Negative for: Constitutional, Gastrointestinal, Neurological, Skin,   Genitourinary, Musculoskeletal, HENT, Endocrine, Cardiovascular,   Respiratory, Psychiatric, Allergic/Imm, Heme/Lymph    Last edited by Marco Martel, OD on 5/11/2017  3:45 PM. (History)        Assessment /Plan     For exam results, see Encounter Report.    Nuclear sclerosis, bilateral    Anatomical narrow angle, bilateral      1. Cat OU--wrote new spex Rx  2. Narrow angles OU w inc iop OU.  Deferred DFE  3. ROSSY--advised SYSTANE ATs TID+    PLAN:    1. Consult--Dr Ribeiro--[facility]  2. rtc 1 yr to me

## 2018-08-17 ENCOUNTER — TELEPHONE (OUTPATIENT)
Dept: PAIN MEDICINE | Facility: CLINIC | Age: 63
End: 2018-08-17

## 2018-08-17 DIAGNOSIS — F41.9 ANXIETY: ICD-10-CM

## 2018-08-17 DIAGNOSIS — M43.10 SPONDYLOLISTHESIS, UNSPECIFIED SPINAL REGION: Primary | ICD-10-CM

## 2018-08-17 RX ORDER — PAROXETINE HYDROCHLORIDE 20 MG/1
20 TABLET, FILM COATED ORAL DAILY
Qty: 30 TABLET | Refills: 0 | Status: SHIPPED | OUTPATIENT
Start: 2018-08-17 | End: 2019-05-31 | Stop reason: SDUPTHER

## 2018-08-17 RX ORDER — PAROXETINE 10 MG/1
10 TABLET, FILM COATED ORAL DAILY
Qty: 30 TABLET | Refills: 0 | Status: SHIPPED | OUTPATIENT
Start: 2018-08-17 | End: 2019-05-31 | Stop reason: SDUPTHER

## 2018-08-17 NOTE — TELEPHONE ENCOUNTER
Spoke with pt and scheduled sx for 8/31/18 @1100, with an arrival time of 0930. Went over location, pre-sx instructions and mailed info out. Pt verbalized understanding.

## 2018-08-17 NOTE — TELEPHONE ENCOUNTER
"----- Message from Julianna Carter sent at 8/17/2018 11:50 AM CDT -----  Contact: self/[phone]  RX request - refill or new RX.  Is this a refill or new RX:  refill  RX name and strength:  paroxetine (PAXIL) 30 MG tablet   Directions: take on tablet a day  Is this a 30 day or 90 day RX:    Local pharmacy or mail order pharmacy:    Pharmacy name and phone # (DON'T enter "on file" or "in chart"): [facility] [phone] (Phone) [phone] (Fax)  Comments:        "

## 2018-08-17 NOTE — TELEPHONE ENCOUNTER
Spoke with pt regarding scheduling sx. Pt stated she will have to check with her  and call back later to schedule.

## 2018-08-31 ENCOUNTER — HOSPITAL ENCOUNTER (OUTPATIENT)
Facility: HOSPITAL | Age: 63
Discharge: HOME | End: 2018-08-31
Attending: ANESTHESIOLOGY | Admitting: ANESTHESIOLOGY
Payer: COMMERCIAL

## 2018-08-31 VITALS
OXYGEN SATURATION: 97 % | BODY MASS INDEX: 26.98 KG/M2 | TEMPERATURE: 99 F | HEIGHT: 64 IN | DIASTOLIC BLOOD PRESSURE: 74 MMHG | HEART RATE: 97 BPM | RESPIRATION RATE: 16 BRPM | WEIGHT: 158 LBS | SYSTOLIC BLOOD PRESSURE: 168 MMHG

## 2018-08-31 DIAGNOSIS — M43.10 SPONDYLOLISTHESIS, UNSPECIFIED SPINAL REGION: ICD-10-CM

## 2018-08-31 DIAGNOSIS — M54.16 LUMBAR RADICULOPATHY: Primary | ICD-10-CM

## 2018-08-31 DIAGNOSIS — M43.16 SPONDYLOLISTHESIS OF LUMBAR REGION: ICD-10-CM

## 2018-08-31 PROCEDURE — S0020 INJECTION, BUPIVICAINE HYDRO: HCPCS

## 2018-08-31 PROCEDURE — 99152 MOD SED SAME PHYS/QHP 5/>YRS: CPT

## 2018-08-31 PROCEDURE — 25000003 PHARM REV CODE 250

## 2018-08-31 PROCEDURE — 99152 MOD SED SAME PHYS/QHP 5/>YRS: CPT | Mod: ,,, | Performed by: ANESTHESIOLOGY

## 2018-08-31 PROCEDURE — 62323 NJX INTERLAMINAR LMBR/SAC: CPT | Mod: ,,, | Performed by: ANESTHESIOLOGY

## 2018-08-31 PROCEDURE — 63600175 PHARM REV CODE 636 W HCPCS

## 2018-08-31 NOTE — OP NOTE
Patient Name: Carla Aguilar  MRN: [id number]    INFORMED CONSENT: The procedure, risks, benefits and options were discussed with patient. There are no contraindications to the procedure. The patient expressed understanding and agreed to proceed. The personnel performing the procedure was discussed. I verify that I personally obtained Carla's consent prior to the start of the procedure and the signed consent can be found on the patient's chart.    PROCEDURE: Caudal Epidural Steroid Injection    Procedure Date: 8/31/2018  Anesthesia:  RN IV Sedation    Sedation: Versed 1 mg and Fentanyl 50 mcg IV    Pre Procedure diagnosis:   1. Lumbar radiculopathy    2. Spondylolisthesis, unspecified spinal region    3. Spondylolisthesis of lumbar region        Post-Procedure diagnosis: SAME    SEDATION ORDERS:  Orders Placed This Encounter    Diet NPO    Notify physician     Notify physician     Notify physician (specify)    Verify informed consent    Vital signs    Place in Outpatient    Cath lab procedure       DESCRIPTION OF PROCEDURE: After fully informed written consent was obtained, the patient was brought to the procedure room and placed in the prone position. Monitoring of pulse oximetry, heart rate, and blood pressure was done pre-procedure, during the procedure, and post-procedure. The low back and buttock area was prepped with chlorhexidine three times and draped in a sterile fashion. Using a 25-gauge 1.5 inch needle, lidocaine 1% was injected subcutaneously over the entry site. The sacrum and sacral cornua were visualized in an AP view.  A 20-gauge, 3 1/2 inch Tuohy needle was inserted and advanced into the sacral hiatus under fluoroscopic guidance.   After the needle passed through the sacrococcygeal ligament, the needle angle was lowered and the needle was advanced 1 cm. There was no evidence of paresthesias throughout needle placement. After negative aspiration for blood and cerebrospinal fluid, 3 ml of  Omnipaque 300 contrast agent was slowly injected. Confirmation of spread of contrast agent within the caudal epidural space was made with fluoroscopic imaging in the AP and lateral views.  Subsequently, 12 mL of  Lidocaine 0.5% and Depo-Medrol 80 mg was slowly administered without resistance. There was no pain on injection. The needle was removed and bleeding was nil.   Initially attempted L4/5 interlaminar approach to procedure. 20-gauge Tuohy was used with loss of resistance to saline.  After negative aspiration for blood and cerebrospinal fluid,Omnipaque 300 was injected. Initially contrast pattern in AP view resembled intrathecal spread. Contrast in lateral view appeared epidural. At this point, the procedure was converted to caudal approach.   The patient tolerated the procedure well and was transferred to the recovery room in stable condition and there was no evidence of procedural complications. A sterile dressing was applied. No specimens collected. Carla was taken back to the recovery room for further observation.     Blood Loss: Nil  Specimen: None    Patient will follow-up in clinic in 2 weeks.

## 2018-08-31 NOTE — INTERVAL H&P NOTE
The patient has been examined and the H&P has been reviewed:    I concur with the findings and no changes have occurred since H&P was written.    Anesthesia/Surgery risks, benefits and alternative options discussed and understood by patient/family.          Active Hospital Problems    Diagnosis  POA    Spondylolisthesis [M43.10]  Not Applicable      Resolved Hospital Problems   No resolved problems to display.

## 2018-08-31 NOTE — PLAN OF CARE
Problem: Patient Care Overview  Goal: Plan of Care Review  Outcome: Ongoing (interventions implemented as appropriate)  Received report from Rita. Patient s/p injection, AAOx3. VSS, no c/o pain or discomfort at this time, resp even and unlabored. Bandaid x2 to lower back is CDI. No active bleeding. No hematoma noted. Post procedure protocol reviewed with patient and patient's family. Understanding verbalized. Family members at bedside. Nurse call bell within reach. Will continue to monitor per post procedure protocol.

## 2018-08-31 NOTE — DISCHARGE SUMMARY
Discharge Note  Short Stay      SUMMARY     Admit Date: 8/31/2018    Attending Physician: Eliot Rosas III      Discharge Physician: Eliot Rosas III      Discharge Date: 8/31/2018 4:15 PM    Final Diagnosis:   1. Lumbar radiculopathy    2. Spondylolisthesis, unspecified spinal region    3. Spondylolisthesis of lumbar region        Disposition: Home or self care    Patient Instructions:   Discharge Medication List as of 8/31/2018 11:51 AM      CONTINUE these medications which have NOT CHANGED    Details   diazePAM (VALIUM) 2 MG tablet Take 1 tablet (2 mg total) by mouth On call Procedure for Anxiety (take 1 tab 30 min prior to procedure.  may take 2nd if needed)., Starting Tue 7/17/2018, Until Thu 8/16/2018, Normal      ibuprofen (ADVIL,MOTRIN) 200 MG tablet Take 200 mg by mouth every 6 (six) hours as needed for Pain., Historical Med      KRILL OIL ORAL Take by mouth once daily. , Historical Med      multivitamin capsule Take 1 capsule by mouth once daily., Historical Med      ranitidine (ZANTAC) 150 MG capsule Take 150 mg by mouth 2 (two) times daily., Historical Med      !! paroxetine (PAXIL) 10 MG tablet Take 1 tablet (10 mg total) by mouth once daily., Starting Fri 8/17/2018, Normal      !! paroxetine (PAXIL) 20 MG tablet Take 1 tablet (20 mg total) by mouth once daily., Starting Fri 8/17/2018, Normal       !! - Potential duplicate medications found. Please discuss with provider.              Discharge Diagnosis: Same as Pre and Post Procedure  Condition on Discharge: Stable.  Diet on Discharge: Same as before.  Activity: as per instruction sheet.  Discharge to: Home with a responsible adult.  Follow up: in 2 weeks

## 2018-08-31 NOTE — DISCHARGE INSTRUCTIONS

## 2018-08-31 NOTE — PLAN OF CARE
Problem: Patient Care Overview  Goal: Plan of Care Review  Outcome: Ongoing (interventions implemented as appropriate)  Patient arrived to room. Admit assessment completed. Plan of care discussed with patient. Gabi/Светлана, RNs notified of sore on R finger that patient took an antibiotic for yesterday. Finger is reddened with a pustule. Awaiting evaluation by MD prior to placing PIV.

## 2018-09-05 ENCOUNTER — TELEPHONE (OUTPATIENT)
Dept: PAIN MEDICINE | Facility: CLINIC | Age: 63
End: 2018-09-05

## 2018-09-05 NOTE — TELEPHONE ENCOUNTER
Called pt in an attempt to schedule f/u appt. Left a msg for her to return call to confirm an appt date/time.

## 2018-09-06 ENCOUNTER — TELEPHONE (OUTPATIENT)
Dept: PAIN MEDICINE | Facility: CLINIC | Age: 63
End: 2018-09-06

## 2018-09-06 NOTE — TELEPHONE ENCOUNTER
Spoke with pt re: her f/u appt. Pt stated that she does not need to come in for a f/u. The injection did not give her any relief and if another procedure is recommended, Dr. Rosas can contact her via phone.

## 2018-09-21 ENCOUNTER — LAB VISIT (OUTPATIENT)
Dept: LAB | Facility: HOSPITAL | Age: 63
End: 2018-09-21
Attending: INTERNAL MEDICINE
Payer: COMMERCIAL

## 2018-09-21 ENCOUNTER — OFFICE VISIT (OUTPATIENT)
Dept: PSYCHIATRY | Facility: CLINIC | Age: 63
End: 2018-09-21
Payer: COMMERCIAL

## 2018-09-21 VITALS
SYSTOLIC BLOOD PRESSURE: 180 MMHG | DIASTOLIC BLOOD PRESSURE: 92 MMHG | BODY MASS INDEX: 27.04 KG/M2 | WEIGHT: 158.38 LBS | HEART RATE: 113 BPM | HEIGHT: 64 IN

## 2018-09-21 DIAGNOSIS — F41.0 PANIC DISORDER: Primary | ICD-10-CM

## 2018-09-21 DIAGNOSIS — F10.20 ALCOHOL USE DISORDER, MODERATE, DEPENDENCE: ICD-10-CM

## 2018-09-21 DIAGNOSIS — K70.9 ALCOHOLIC LIVER DISEASE: ICD-10-CM

## 2018-09-21 LAB
FERRITIN SERPL-MCNC: 672 NG/ML
HCV AB SERPL QL IA: NEGATIVE
IGG SERPL-MCNC: 634 MG/DL
INR PPP: 0.9
IRON SERPL-MCNC: 199 UG/DL
PROTHROMBIN TIME: 9.6 SEC
SATURATED IRON: 48 %
TOTAL IRON BINDING CAPACITY: 411 UG/DL
TRANSFERRIN SERPL-MCNC: 278 MG/DL

## 2018-09-21 PROCEDURE — 99999 PR PBB SHADOW E&M-EST. PATIENT-LVL II: CPT | Mod: PBBFAC,,, | Performed by: PSYCHIATRY & NEUROLOGY

## 2018-09-21 PROCEDURE — 83540 ASSAY OF IRON: CPT

## 2018-09-21 PROCEDURE — 85610 PROTHROMBIN TIME: CPT

## 2018-09-21 PROCEDURE — 90792 PSYCH DIAG EVAL W/MED SRVCS: CPT | Mod: S$GLB,,, | Performed by: PSYCHIATRY & NEUROLOGY

## 2018-09-21 PROCEDURE — 82784 ASSAY IGA/IGD/IGG/IGM EACH: CPT

## 2018-09-21 PROCEDURE — 86803 HEPATITIS C AB TEST: CPT

## 2018-09-21 PROCEDURE — 86038 ANTINUCLEAR ANTIBODIES: CPT

## 2018-09-21 PROCEDURE — 82728 ASSAY OF FERRITIN: CPT

## 2018-09-21 PROCEDURE — 36415 COLL VENOUS BLD VENIPUNCTURE: CPT

## 2018-09-21 PROCEDURE — 86256 FLUORESCENT ANTIBODY TITER: CPT
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
## 2018-09-24 ENCOUNTER — TELEPHONE (OUTPATIENT)
Dept: HEPATOLOGY | Facility: CLINIC | Age: 63
End: 2018-09-24

## 2018-09-24 LAB
ANA SER QL IF: NORMAL
SMOOTH MUSCLE AB TITR SER IF: NORMAL {TITER}

## 2018-09-24 NOTE — TELEPHONE ENCOUNTER
----- Message from Sindi Samaniego MD sent at 9/24/2018  4:43 PM CDT -----  Please inform the patient that labs do not show another cause for chronic liver disease.  Tested for autoimmune hepatitis, iron accumulation, and hepatitis C with negative testing.

## 2018-10-01 ENCOUNTER — TELEPHONE (OUTPATIENT)
Dept: PAIN MEDICINE | Facility: CLINIC | Age: 63
End: 2018-10-01

## 2018-10-01 NOTE — TELEPHONE ENCOUNTER
----- Message from Belkys Hernández sent at 10/1/2018  9:51 AM CDT -----  Contact: brooks  Name of Who is Calling: brooks    What is the request in detail: Patient is requesting a call back to schedule her post op appointment from her procedure she had on 08/31/2018      Can the clinic reply by [facility]: no      [phone]    1045am - spoke with patient about scheduling a follow up from the caudal karolina.  New appt scheduled for Monday 10/8/2018 @ 315am.  appt letter to be mailed to patient.  Patient verbalized understanding.

## 2018-10-09 ENCOUNTER — TELEPHONE (OUTPATIENT)
Dept: HEPATOLOGY | Facility: CLINIC | Age: 63
End: 2018-10-09

## 2018-10-10 ENCOUNTER — TELEPHONE (OUTPATIENT)
Dept: HEPATOLOGY | Facility: HOSPITAL | Age: 63
End: 2018-10-10

## 2018-10-10 DIAGNOSIS — K70.30 ALCOHOLIC CIRRHOSIS OF LIVER WITHOUT ASCITES: Primary | ICD-10-CM

## 2018-10-10 NOTE — TELEPHONE ENCOUNTER
----- Message from Gisell Beckford sent at 10/9/2018  9:28 AM CDT -----  Hi Dr. Samaniego    Can you please put orders in for a fibroscan, and labs for Ms. Aguilar.    Thanks,  Gisell

## 2018-10-15 ENCOUNTER — OFFICE VISIT (OUTPATIENT)
Dept: PAIN MEDICINE | Facility: CLINIC | Age: 63
End: 2018-10-15
Attending: ANESTHESIOLOGY
Payer: COMMERCIAL

## 2018-10-15 VITALS
OXYGEN SATURATION: 98 % | HEART RATE: 91 BPM | SYSTOLIC BLOOD PRESSURE: 147 MMHG | RESPIRATION RATE: 16 BRPM | HEIGHT: 64 IN | TEMPERATURE: 98 F | BODY MASS INDEX: 26.98 KG/M2 | WEIGHT: 158 LBS | DIASTOLIC BLOOD PRESSURE: 85 MMHG

## 2018-10-15 DIAGNOSIS — M51.36 DDD (DEGENERATIVE DISC DISEASE), LUMBAR: Primary | ICD-10-CM

## 2018-10-15 DIAGNOSIS — M43.16 SPONDYLOLISTHESIS OF LUMBAR REGION: ICD-10-CM

## 2018-10-15 DIAGNOSIS — M48.062 SPINAL STENOSIS OF LUMBAR REGION WITH NEUROGENIC CLAUDICATION: ICD-10-CM

## 2018-10-15 PROCEDURE — 99214 OFFICE O/P EST MOD 30 MIN: CPT | Mod: S$GLB,,, | Performed by: ANESTHESIOLOGY

## 2018-10-15 PROCEDURE — 3008F BODY MASS INDEX DOCD: CPT | Mod: CPTII,S$GLB,, | Performed by: ANESTHESIOLOGY

## 2018-10-15 NOTE — H&P (VIEW-ONLY)
Chronic patient Established Note (Follow up visit)      SUBJECTIVE:    Carla Aguilar presents to the clinic for a follow-up appointment for low back pain. Since the last visit, Carla Aguilar states the pain has been persistent. Current pain intensity is 5/10. The pain is located in the bilateral low back area and radiates into the posterior thighs. She describes the pain as sharp and stabbing. The pain is present primarily when standing and walking. She reports no improvement after caudal CARLYLE. She has tried NSAIDS and home exercises including aquatic therapy with minimal improvement.    She denies bowel/bladder incontinence and significant motor weakness.    Pain Disability Index Review:  Last 3 PDI Scores 10/15/2018   Pain Disability Index (PDI) 33       Pain Medications:    - Motrin 200 mg 3 tabs QHS as needed    Opioid Contract: no     report:  Reviewed    Pain Procedures:   Caudal CARLYLE    Imaging:     Lumbar MRI (7/31/2018):    FINDINGS:  Lumbar spine alignment demonstrates 3 mm of retrolisthesis of L2 on L3 and 4-5 mm of anterolisthesis of L4 on L5.  No spondylolysis.  Vertebral body heights are well maintained without evidence for fracture.  Prominent vertebral endplate edema and mild facet arthropathy noted at L4-L5 with a small bilateral posteriorly oriented synovial cysts.  No marrow signal abnormality to suggest an infiltrative process.    Distal spinal cord demonstrates normal contour and signal intensity.  Cauda equina demonstrates no significant abnormalities noting limitation due to adjacent canal stenosis and lack of intravenous contrast.  Conus medullaris terminates at T12-L1.    There is moderate fatty infiltration of the posterior paraspinal musculature.  Partially seen lesion identified within the posterior right hepatic lobe, previously characterized as a hemangioma on MRI dated 10/16/2015.    T12-L1: No spinal canal stenosis or neural foraminal narrowing.    L1-L2: No spinal canal stenosis or  neural foraminal narrowing.    L2-L3: There is a small circumferential disc bulge with small paracentral annular fissures.  There is mild bilateral facet hypertrophy.  Findings contribute to moderate right and mild left neural foraminal narrowing.  No spinal canal stenosis.    L3-L4: There is a small circumferential disc bulge.  There is moderate left and mild right facet hypertrophy.  Findings contribute to mild bilateral neural foraminal narrowing.  No spinal canal stenosis.    L4-L5: There is a moderate circumferential disc bulge with a posterior annular fissure.  There is severe bilateral facet hypertrophy with associated marrow edema and small posteriorly oriented synovial cysts.  There is moderate bilateral ligamentum flavum buckling.  Findings contribute to moderate spinal canal stenosis and moderate bilateral neural foraminal narrowing.    L5-S1: There is a small circumferential disc bulge.  There is mild bilateral facet hypertrophy.  Findings contribute to mild left-sided neural foraminal narrowing.  No spinal canal stenosis.    Allergies: Review of patient's allergies indicates:  No Known Allergies    Current Medications:   Current Outpatient Medications   Medication Sig Dispense Refill    ibuprofen (ADVIL,MOTRIN) 200 MG tablet Take 200 mg by mouth every 6 (six) hours as needed for Pain.      KRILL OIL ORAL Take by mouth once daily.       multivitamin capsule Take 1 capsule by mouth once daily.      paroxetine (PAXIL) 10 MG tablet Take 1 tablet (10 mg total) by mouth once daily. 30 tablet 0    paroxetine (PAXIL) 20 MG tablet Take 1 tablet (20 mg total) by mouth once daily. 30 tablet 0     No current facility-administered medications for this visit.        REVIEW OF SYSTEMS:    GENERAL:  No weight loss, malaise or fevers.  HEENT:  Negative for frequent or significant headaches.  NECK:  Negative for pain and significant neck swelling.  RESPIRATORY:  Negative for wheezing or shortness of  breath.  CARDIOVASCULAR:  Negative for chest pain, leg swelling or palpitations.  GI:  Negative for abdominal discomfort, blood in stools or black stools or change in bowel habits.  MUSCULOSKELETAL:  See HPI.  SKIN:  Negative for lesions, rash, and itching.  PSYCH: + anxiety  HEMATOLOGY/LYMPHOLOGY:  Negative for prolonged bleeding, bruising easily or swollen nodes.  NEURO:   No history of paralysis, seizures or tremors.  All other reviewed and negative other than HPI.    Past Medical History:  Past Medical History:   Diagnosis Date    Anxiety     Arthritis     Back pain     Hyperlipidemia     Thrombocytopenia        Past Surgical History:  Past Surgical History:   Procedure Laterality Date    CATARACT EXTRACTION W/  INTRAOCULAR LENS IMPLANT Right 08/28/2017    Dr. Barraza    CATARACT EXTRACTION W/  INTRAOCULAR LENS IMPLANT Left 09/18/2017    Dr Barraza     INSERTION-INTRAOCULAR LENS (IOL) Left 9/18/2017    Performed [facility]    INSERTION-INTRAOCULAR LENS (IOL) Right 8/28/2017    Performed [facility] Araon [facility]    PHACOEMULSIFICATION-ASPIRATION-CATARACT Left 9/18/2017    [facility]    PHACOEMULSIFICATION-ASPIRATION-CATARACT Right 8/28/2017    [facility]    THYROIDECTOMY, PARTIAL         Family History:  Family History   Problem Relation Age of Onset    Hyperlipidemia Father     Stroke Father     Cataracts Mother     Hypertension Sister     Diabetes Paternal Uncle     Cancer Maternal Grandmother     Cancer Paternal Grandmother     Colon cancer Neg Hx     Ovarian cancer Neg Hx     Breast cancer Neg Hx     Blindness Neg Hx     Amblyopia Neg Hx     Glaucoma Neg Hx     Macular degeneration Neg Hx     Retinal detachment Neg Hx     Strabismus Neg Hx     Thyroid disease Neg Hx        Social History:  Social History     Socioeconomic History    Marital status: Single     Spouse name: None    Number of  "children: None    Years of education: None    Highest education level: None   Social Needs    Financial resource strain: None    Food insecurity - worry: None    Food insecurity - inability: None    Transportation needs - medical: None    Transportation needs - non-medical: None   Occupational History    Occupation: nails    Tobacco Use    Smoking status: Current Every Day Smoker     Packs/day: 0.50     Types: Cigars    Smokeless tobacco: Current User    Tobacco comment: about 5 cigarettes  per day   Substance and Sexual Activity    Alcohol use: Yes     Alcohol/week: 12.6 oz     Types: 21 Shots of liquor per week     Comment: daily; wine or martinis    Drug use: No    Sexual activity: None   Other Topics Concern    None   Social History Narrative    None       OBJECTIVE:    BP (!) 147/85   Pulse 91   Temp 98.3 °F (36.8 °C)   Resp 16   Ht 5' 4" (1.626 m)   Wt 71.7 kg (158 lb)   SpO2 98%   BMI 27.12 kg/m²     PHYSICAL EXAMINATION:    GENERAL: Well appearing, in no acute distress.   PSYCH:  Mood and affect is appropriate.  Awake, alert, and oriented x 3.  SKIN: Skin color, texture, turgor normal, no rashes or lesions  HEENT: Normocephalic, atraumatic.  EOM intact.  CV: Radial pulses are 2+.  RESP:  Respirations are unlabored.  GI: Abdomen soft and non-tender.  MSK:  No atrophy or tone abnormalities are noted.                 Neck: No pain with neck flexion, extension, or lateral rotation.  No obvious deformity or signs of trauma.  Normal cervical spine range of motion.     Back: Straight leg raising in the sitting and supine positions is negative for radicular pain. Mild tenderness to palpation over the lumbar paraspinous muscles.  No pain with facet loading and back extension/rotation. ROM is normal.     Buttocks:  No pain to palpation over the PSIS. -MICHAEL     Extremities:  Peripheral joint ROM is full and pain free without obvious instability or laxity in all four extremities. No edema or skin " discolorations noted.      Gait:  Gait is normal.     NEUR:  Bilateral lower extremity coordination and muscle stretch reflexes are physiologic and symmetric. Strength testing is 5/5 throughout all muscle groups in the upper and lower extremities. No loss of sensation is noted.       ASSESSMENT: 62 y.o.  female with chronic low back and bilateral leg pain, consistent with lumbar spinal stenosis. Also a likely component of facet arthropathy.     1. DDD (degenerative disc disease), lumbar    2. Spondylolisthesis of lumbar region    3. Spinal stenosis of lumbar region with neurogenic claudication          PLAN:     - I have stressed the importance of physical activity and a home exercise plan to help with pain and improve health.  - Schedule for a Bilateral L4/5 Transforaminal epidural steroid injection to help with pain and progress with a home exercise plan.  - Counseled patient regarding the importance of physical therapy.  - RTC after procedure.    The above plan and management options were discussed at length with patient. Patient is in agreement with the above and verbalized understanding.    Eliot Rosas III  10/15/2018

## 2018-10-15 NOTE — PROGRESS NOTES
Chronic patient Established Note (Follow up visit)      SUBJECTIVE:    Carla Aguilar presents to the clinic for a follow-up appointment for low back pain. Since the last visit, Carla Aguilar states the pain has been persistent. Current pain intensity is 5/10. The pain is located in the bilateral low back area and radiates into the posterior thighs. She describes the pain as sharp and stabbing. The pain is present primarily when standing and walking. She reports no improvement after caudal CARLYLE. She has tried NSAIDS and home exercises including aquatic therapy with minimal improvement.    She denies bowel/bladder incontinence and significant motor weakness.    Pain Disability Index Review:  Last 3 PDI Scores 10/15/2018   Pain Disability Index (PDI) 33       Pain Medications:    - Motrin 200 mg 3 tabs QHS as needed    Opioid Contract: no     report:  Reviewed    Pain Procedures:   Caudal CARLYLE    Imaging:     Lumbar MRI (7/31/2018):    FINDINGS:  Lumbar spine alignment demonstrates 3 mm of retrolisthesis of L2 on L3 and 4-5 mm of anterolisthesis of L4 on L5.  No spondylolysis.  Vertebral body heights are well maintained without evidence for fracture.  Prominent vertebral endplate edema and mild facet arthropathy noted at L4-L5 with a small bilateral posteriorly oriented synovial cysts.  No marrow signal abnormality to suggest an infiltrative process.    Distal spinal cord demonstrates normal contour and signal intensity.  Cauda equina demonstrates no significant abnormalities noting limitation due to adjacent canal stenosis and lack of intravenous contrast.  Conus medullaris terminates at T12-L1.    There is moderate fatty infiltration of the posterior paraspinal musculature.  Partially seen lesion identified within the posterior right hepatic lobe, previously characterized as a hemangioma on MRI dated 10/16/2015.    T12-L1: No spinal canal stenosis or neural foraminal narrowing.    L1-L2: No spinal canal stenosis or  neural foraminal narrowing.    L2-L3: There is a small circumferential disc bulge with small paracentral annular fissures.  There is mild bilateral facet hypertrophy.  Findings contribute to moderate right and mild left neural foraminal narrowing.  No spinal canal stenosis.    L3-L4: There is a small circumferential disc bulge.  There is moderate left and mild right facet hypertrophy.  Findings contribute to mild bilateral neural foraminal narrowing.  No spinal canal stenosis.    L4-L5: There is a moderate circumferential disc bulge with a posterior annular fissure.  There is severe bilateral facet hypertrophy with associated marrow edema and small posteriorly oriented synovial cysts.  There is moderate bilateral ligamentum flavum buckling.  Findings contribute to moderate spinal canal stenosis and moderate bilateral neural foraminal narrowing.    L5-S1: There is a small circumferential disc bulge.  There is mild bilateral facet hypertrophy.  Findings contribute to mild left-sided neural foraminal narrowing.  No spinal canal stenosis.    Allergies: Review of patient's allergies indicates:  No Known Allergies    Current Medications:   Current Outpatient Medications   Medication Sig Dispense Refill    ibuprofen (ADVIL,MOTRIN) 200 MG tablet Take 200 mg by mouth every 6 (six) hours as needed for Pain.      KRILL OIL ORAL Take by mouth once daily.       multivitamin capsule Take 1 capsule by mouth once daily.      paroxetine (PAXIL) 10 MG tablet Take 1 tablet (10 mg total) by mouth once daily. 30 tablet 0    paroxetine (PAXIL) 20 MG tablet Take 1 tablet (20 mg total) by mouth once daily. 30 tablet 0     No current facility-administered medications for this visit.        REVIEW OF SYSTEMS:    GENERAL:  No weight loss, malaise or fevers.  HEENT:  Negative for frequent or significant headaches.  NECK:  Negative for pain and significant neck swelling.  RESPIRATORY:  Negative for wheezing or shortness of  breath.  CARDIOVASCULAR:  Negative for chest pain, leg swelling or palpitations.  GI:  Negative for abdominal discomfort, blood in stools or black stools or change in bowel habits.  MUSCULOSKELETAL:  See HPI.  SKIN:  Negative for lesions, rash, and itching.  PSYCH: + anxiety  HEMATOLOGY/LYMPHOLOGY:  Negative for prolonged bleeding, bruising easily or swollen nodes.  NEURO:   No history of paralysis, seizures or tremors.  All other reviewed and negative other than HPI.    Past Medical History:  Past Medical History:   Diagnosis Date    Anxiety     Arthritis     Back pain     Hyperlipidemia     Thrombocytopenia        Past Surgical History:  Past Surgical History:   Procedure Laterality Date    CATARACT EXTRACTION W/  INTRAOCULAR LENS IMPLANT Right 08/28/2017    Dr. Barraza    CATARACT EXTRACTION W/  INTRAOCULAR LENS IMPLANT Left 09/18/2017    Dr Barraza     INSERTION-INTRAOCULAR LENS (IOL) Left 9/18/2017    [facility]    INSERTION-INTRAOCULAR LENS (IOL) Right 8/28/2017    [facility]    PHACOEMULSIFICATION-ASPIRATION-CATARACT Left 9/18/2017    [facility]    PHACOEMULSIFICATION-ASPIRATION-CATARACT Right 8/28/2017    [facility]    THYROIDECTOMY, PARTIAL         Family History:  Family History   Problem Relation Age of Onset    Hyperlipidemia Father     Stroke Father     Cataracts Mother     Hypertension Sister     Diabetes Paternal Uncle     Cancer Maternal Grandmother     Cancer Paternal Grandmother     Colon cancer Neg Hx     Ovarian cancer Neg Hx     Breast cancer Neg Hx     Blindness Neg Hx     Amblyopia Neg Hx     Glaucoma Neg Hx     Macular degeneration Neg Hx     Retinal detachment Neg Hx     Strabismus Neg Hx     Thyroid disease Neg Hx        Social History:  Social History     Socioeconomic History    Marital status: Single     Spouse name: None    Number of  "children: None    Years of education: None    Highest education level: None   Social Needs    Financial resource strain: None    Food insecurity - worry: None    Food insecurity - inability: None    Transportation needs - medical: None    Transportation needs - non-medical: None   Occupational History    Occupation: nails    Tobacco Use    Smoking status: Current Every Day Smoker     Packs/day: 0.50     Types: Cigars    Smokeless tobacco: Current User    Tobacco comment: about 5 cigarettes  per day   Substance and Sexual Activity    Alcohol use: Yes     Alcohol/week: 12.6 oz     Types: 21 Shots of liquor per week     Comment: daily; wine or martinis    Drug use: No    Sexual activity: None   Other Topics Concern    None   Social History Narrative    None       OBJECTIVE:    BP (!) 147/85   Pulse 91   Temp 98.3 °F (36.8 °C)   Resp 16   Ht 5' 4" (1.626 m)   Wt 71.7 kg (158 lb)   SpO2 98%   BMI 27.12 kg/m²     PHYSICAL EXAMINATION:    GENERAL: Well appearing, in no acute distress.   PSYCH:  Mood and affect is appropriate.  Awake, alert, and oriented x 3.  SKIN: Skin color, texture, turgor normal, no rashes or lesions  HEENT: Normocephalic, atraumatic.  EOM intact.  CV: Radial pulses are 2+.  RESP:  Respirations are unlabored.  GI: Abdomen soft and non-tender.  MSK:  No atrophy or tone abnormalities are noted.                 Neck: No pain with neck flexion, extension, or lateral rotation.  No obvious deformity or signs of trauma.  Normal cervical spine range of motion.     Back: Straight leg raising in the sitting and supine positions is negative for radicular pain. Mild tenderness to palpation over the lumbar paraspinous muscles.  No pain with facet loading and back extension/rotation. ROM is normal.     Buttocks:  No pain to palpation over the PSIS. -MICHAEL     Extremities:  Peripheral joint ROM is full and pain free without obvious instability or laxity in all four extremities. No edema or skin " discolorations noted.      Gait:  Gait is normal.     NEUR:  Bilateral lower extremity coordination and muscle stretch reflexes are physiologic and symmetric. Strength testing is 5/5 throughout all muscle groups in the upper and lower extremities. No loss of sensation is noted.       ASSESSMENT: 62 y.o.  female with chronic low back and bilateral leg pain, consistent with lumbar spinal stenosis. Also a likely component of facet arthropathy.     1. DDD (degenerative disc disease), lumbar    2. Spondylolisthesis of lumbar region    3. Spinal stenosis of lumbar region with neurogenic claudication          PLAN:     - I have stressed the importance of physical activity and a home exercise plan to help with pain and improve health.  - Schedule for a Bilateral L4/5 Transforaminal epidural steroid injection to help with pain and progress with a home exercise plan.  - Counseled patient regarding the importance of physical therapy.  - RTC after procedure.    The above plan and management options were discussed at length with patient. Patient is in agreement with the above and verbalized understanding.    Eliot Rosas III  10/15/2018

## 2018-10-17 ENCOUNTER — TELEPHONE (OUTPATIENT)
Dept: CARDIOLOGY | Facility: HOSPITAL | Age: 63
End: 2018-10-17

## 2018-10-17 NOTE — TELEPHONE ENCOUNTER
Placed call to schedule for Pain Management. No answer/message left .  10-17-18 at 1200 Noon.   Светлана

## 2018-10-23 ENCOUNTER — OFFICE VISIT (OUTPATIENT)
Dept: OPTOMETRY | Facility: CLINIC | Age: 63
End: 2018-10-23
Payer: COMMERCIAL

## 2018-10-23 DIAGNOSIS — H52.4 PRESBYOPIA: ICD-10-CM

## 2018-10-23 DIAGNOSIS — Z13.5 GLAUCOMA SCREENING: ICD-10-CM

## 2018-10-23 DIAGNOSIS — H04.123 DRY EYES, BILATERAL: Primary | ICD-10-CM

## 2018-10-23 PROCEDURE — 92014 COMPRE OPH EXAM EST PT 1/>: CPT | Mod: S$GLB,,, | Performed by: OPTOMETRIST

## 2018-10-23 PROCEDURE — 99999 PR PBB SHADOW E&M-EST. PATIENT-LVL II: CPT | Mod: PBBFAC,,, | Performed by: OPTOMETRIST

## 2018-10-23 PROCEDURE — 92015 DETERMINE REFRACTIVE STATE: CPT | Mod: S$GLB,,, | Performed by: OPTOMETRIST

## 2018-10-23 NOTE — PROGRESS NOTES
HPI     DLS:10/20/17  Pt state she had cat SX last year, uses OTC readers does not have with   her. Pt states her dist. Is not that great she is not able to see street   signs till she drives up to them. Wants to talk about why her eyes are   always bloodshot, also states she gets  Mucus in her eye that makes her VA   blurry.   Flashes sometimes when in bright light   Visine-A gtts       Last edited by Marco Martel, OD on 10/23/2018  1:57 PM. (History)        ROS     Positive for: Eyes (cat surgery OU)    Negative for: Constitutional, Gastrointestinal, Neurological, Skin,   Genitourinary, Musculoskeletal, HENT, Endocrine, Cardiovascular,   Respiratory, Psychiatric, Allergic/Imm, Heme/Lymph    Last edited by Marco Martel, OD on 10/23/2018  2:48 PM. (History)        Assessment /Plan     For exam results, see Encounter Report.    Dry eyes, bilateral    Glaucoma screening    Presbyopia      1. Mild pco sp pciol OU--pt happy w otc readers  2. Pt complains of red eyes--suspect due to dryness.  Just using visine allergy drops now    PLAN:    1. DC Visine--start SYSTANE COMPLETE TID+  2. Pt will call if redness persists--may add LUMIFY.  Otherwise rtc 1 yr

## 2018-10-26 ENCOUNTER — HOSPITAL ENCOUNTER (OUTPATIENT)
Facility: HOSPITAL | Age: 63
Discharge: HOME | End: 2018-10-26
Attending: ANESTHESIOLOGY | Admitting: ANESTHESIOLOGY
Payer: COMMERCIAL

## 2018-10-26 VITALS
SYSTOLIC BLOOD PRESSURE: 171 MMHG | BODY MASS INDEX: 26.98 KG/M2 | TEMPERATURE: 98 F | OXYGEN SATURATION: 96 % | HEART RATE: 74 BPM | RESPIRATION RATE: 18 BRPM | DIASTOLIC BLOOD PRESSURE: 80 MMHG | HEIGHT: 64 IN | WEIGHT: 158 LBS

## 2018-10-26 DIAGNOSIS — M51.36 DDD (DEGENERATIVE DISC DISEASE), LUMBAR: ICD-10-CM

## 2018-10-26 DIAGNOSIS — M54.16 LUMBAR RADICULOPATHY: Primary | ICD-10-CM

## 2018-10-26 PROCEDURE — 64483 NJX AA&/STRD TFRM EPI L/S 1: CPT

## 2018-10-26 PROCEDURE — 99152 MOD SED SAME PHYS/QHP 5/>YRS: CPT

## 2018-10-26 PROCEDURE — 63600175 PHARM REV CODE 636 W HCPCS

## 2018-10-26 PROCEDURE — 25000003 PHARM REV CODE 250

## 2018-10-26 PROCEDURE — 99152 MOD SED SAME PHYS/QHP 5/>YRS: CPT | Mod: ,,, | Performed by: ANESTHESIOLOGY

## 2018-10-26 PROCEDURE — 64483 NJX AA&/STRD TFRM EPI L/S 1: CPT | Mod: 50,,, | Performed by: ANESTHESIOLOGY

## 2018-10-26 RX ORDER — SODIUM CHLORIDE 9 MG/ML
500 INJECTION, SOLUTION INTRAVENOUS CONTINUOUS
Status: DISCONTINUED | OUTPATIENT
Start: 2018-10-26 | End: 2018-10-26 | Stop reason: HOSPADM

## 2018-10-26 NOTE — NURSING
Pt transferred from procedure via stretcher.  Vss.  Pt aao, tolerating po.  Post op orders and assessment initiated.  2 bandaids on either side of lower back remain cdi. 0 bleed, 0 hematoma.  Pt in no acute distress and verbalizes no complaints.

## 2018-10-26 NOTE — DISCHARGE SUMMARY
Discharge Note  Short Stay      SUMMARY     Admit Date: 10/26/2018    Attending Physician: Eliot Rosas III      Discharge Physician: Eliot Rosas III      Discharge Date: 10/26/2018 11:00 AM    Final Diagnosis:   1. Lumbar radiculopathy    2. DDD (degenerative disc disease), lumbar        Disposition: Home or self care    Patient Instructions:   Discharge Medication List as of 10/26/2018  9:55 AM      CONTINUE these medications which have NOT CHANGED    Details   ibuprofen (ADVIL,MOTRIN) 200 MG tablet Take 200 mg by mouth every 6 (six) hours as needed for Pain., Historical Med      KRILL OIL ORAL Take by mouth once daily. , Historical Med      multivitamin capsule Take 1 capsule by mouth once daily., Historical Med      !! paroxetine (PAXIL) 10 MG tablet Take 1 tablet (10 mg total) by mouth once daily., Starting Fri 8/17/2018, Normal      !! paroxetine (PAXIL) 20 MG tablet Take 1 tablet (20 mg total) by mouth once daily., Starting Fri 8/17/2018, Normal       !! - Potential duplicate medications found. Please discuss with provider.              Discharge Diagnosis: Same as Pre and Post Procedure  Condition on Discharge: Stable.  Diet on Discharge: Same as before.  Activity: as per instruction sheet.  Discharge to: Home with a responsible adult.  Follow up: 11/12/2018

## 2018-10-26 NOTE — NURSING
Dr. Rosas notified pt's legs became weak while pulling on pants and pt went down to the floor.  Pt reports no discomfort or pain.  Dr. Rosas assessed patient after fall.  Will continue to monitor. Family at bedside.

## 2018-10-26 NOTE — OP NOTE
Patient Name: Carla Aguilar  MRN: [id number]    INFORMED CONSENT: The procedure, risks, benefits and options were discussed with patient. There are no contraindications to the procedure. The patient expressed understanding and agreed to proceed. The personnel performing the procedure was discussed. I verify that I personally obtained the patient's consent prior to the start of the procedure and the signed consent can be found on the patient's chart.    PROCEDURE: Bilateral L4/5 TRANSFORAMINAL EPIDURAL STEROID INJECTION    Procedure Date: 10/26/2018  Surgeon(s) and Role:     * Eliot Rosas III, MD - Primary  Anesthesia: RN IV Sedation  Procedure(s) (LRB):  INJECTION, STEROID, EPIDURAL, TRANSFORAMINAL APPROACH   BILAT L4/5 (Bilateral)    Pre Procedure diagnosis: DDD (degenerative disc disease), lumbar     Post-Procedure diagnosis: SAME    Sedation: Yes - Fentanyl 50 mcg and Midazolam 1 mg    DESCRIPTION OF PROCEDURE: The patient was brought to the procedure room. IV access was obtained prior to the procedure. The patient was positioned prone on the fluoroscopy table. Continuous hemodynamic monitoring was initiated including blood pressure, EKG, and pulse oximetry.  The skin was prepped with chlorhexidine three times and draped in a sterile fashion. Skin anesthesia was achieved using 5 mL of lidocaine 1% over the respective injection sites.     An oblique fluoroscopic view was obtained, with the superior articular process of the inferior vertebral body aligned with the pedicle. The tip of a 22-gauge 5-inch Quincke-type spinal needle was advanced toward the 6 oclock position of the pedicle under intermittent fluoroscopic guidance. Confirmation of proper needle position was made with AP, oblique, and lateral fluoroscopic views. Negative aspiration for blood or CSF was confirmed. 0.5 mL of Omnipaque 300 was injected at each side. Live fluoroscopic imaging revealed a clear outline of the spinal nerve with proximal  spread of agent through the neural foramen into the anterior epidural space. A total combination of 2 mL of Lidocaine 1% and 40 mg Depo-Medrol was injected at each side. The needles were removed and bleeding was nil.  A sterile dressing was applied. Carla was taken back to the recovery room for further observation.     Blood Loss: Nil

## 2018-10-26 NOTE — DISCHARGE INSTRUCTIONS

## 2018-10-26 NOTE — PLAN OF CARE
Problem: Patient Care Overview  Goal: Plan of Care Review  Outcome: Ongoing (interventions implemented as appropriate)  Pt arrived to unit accompanied by family.  Vss. Oriented pt to unit.  Pre op orders and assessment initiated.  Pt remains npo.  Pt in no acute distress and verbalizes no complaints. Will continue to monitor.

## 2018-10-26 NOTE — PLAN OF CARE
Problem: Patient Care Overview  Goal: Plan of Care Review  Outcome: Ongoing (interventions implemented as appropriate)  Pt transferred from procedure via stretcher.  Vss.  Post op ordes and assessment initiated.  Pt aao, tolerating po.  Family remains at bedside.  Pt in no acute distress and verbalizes no complaints. Will continue to monitor.

## 2018-11-16 DIAGNOSIS — Z12.11 COLON CANCER SCREENING: ICD-10-CM

## 2018-11-19 ENCOUNTER — TELEPHONE (OUTPATIENT)
Dept: PAIN MEDICINE | Facility: CLINIC | Age: 63
End: 2018-11-19

## 2018-11-19 NOTE — TELEPHONE ENCOUNTER
----- Message from Josette De Paz sent at 11/19/2018  6:58 AM CST -----  Contact: Pt  Pt is requesting a callback from office to r/s post op visit she have scheduled today    Pt can be reached at [phone]    Thanks    901am - called to RS today's appt.  NO answer.  Left detailes msg that the appt has been RS to next Monday 11/26/18 @ 945am.  If pt has concerns she should call back.

## 2018-12-10 ENCOUNTER — OFFICE VISIT (OUTPATIENT)
Dept: PAIN MEDICINE | Facility: CLINIC | Age: 63
End: 2018-12-10
Attending: ANESTHESIOLOGY
Payer: COMMERCIAL

## 2018-12-10 VITALS
BODY MASS INDEX: 27.14 KG/M2 | OXYGEN SATURATION: 99 % | HEART RATE: 107 BPM | DIASTOLIC BLOOD PRESSURE: 91 MMHG | RESPIRATION RATE: 18 BRPM | TEMPERATURE: 98 F | SYSTOLIC BLOOD PRESSURE: 167 MMHG | HEIGHT: 64 IN | WEIGHT: 159 LBS

## 2018-12-10 DIAGNOSIS — M48.061 DEGENERATIVE LUMBAR SPINAL STENOSIS: Primary | ICD-10-CM

## 2018-12-10 DIAGNOSIS — M51.36 DDD (DEGENERATIVE DISC DISEASE), LUMBAR: ICD-10-CM

## 2018-12-10 DIAGNOSIS — M47.816 LUMBAR SPONDYLOSIS: ICD-10-CM

## 2018-12-10 PROCEDURE — 3008F BODY MASS INDEX DOCD: CPT | Mod: CPTII,S$GLB,, | Performed by: ANESTHESIOLOGY

## 2018-12-10 PROCEDURE — 99214 OFFICE O/P EST MOD 30 MIN: CPT | Mod: S$GLB,,, | Performed by: ANESTHESIOLOGY

## 2018-12-10 NOTE — H&P (VIEW-ONLY)
Chronic patient Established Note (Follow up visit)    INTERVAL HISTORY (12/10/2018):    Carla Aguilar presents to the clinic today for a follow-up appointment for low back pain. Since the last visit, the back pain has been persistent. The patient reports >50% pain relief after Bilateral L4/5 TESI which lasted 2 weeks. She reports her back pain has now returned to its pre-procedure level. Current pain intensity is 8/10. The pain is made worse with standing and walking. The pain is relieved by leaning forward at the waist and rest.    She denies bowel/bladder incontinence and significant motor weakness.    SUBJECTIVE:    Carla Aguilar presents to the clinic for a follow-up appointment for low back pain. Since the last visit, Carla Aguilar states the pain has been persistent. Current pain intensity is 5/10. The pain is located in the bilateral low back area and radiates into the posterior thighs. She describes the pain as sharp and stabbing. The pain is present primarily when standing and walking. She reports no improvement after caudal CARLYLE. She has tried NSAIDS and home exercises including aquatic therapy with minimal improvement.    She denies bowel/bladder incontinence and significant motor weakness.    Pain Disability Index Review:  Last 3 PDI Scores 12/10/2018 10/15/2018   Pain Disability Index (PDI) 32 33       Pain Medications:    - Motrin 200 mg 3 tabs QHS as needed    Opioid Contract: no     report:  Reviewed    Pain Procedures:   Bilateral L4/5 TESI  Caudal CARLYLE    Imaging:     Lumbar MRI (7/31/2018):    FINDINGS:  Lumbar spine alignment demonstrates 3 mm of retrolisthesis of L2 on L3 and 4-5 mm of anterolisthesis of L4 on L5.  No spondylolysis.  Vertebral body heights are well maintained without evidence for fracture.  Prominent vertebral endplate edema and mild facet arthropathy noted at L4-L5 with a small bilateral posteriorly oriented synovial cysts.  No marrow signal abnormality to suggest an  infiltrative process.    Distal spinal cord demonstrates normal contour and signal intensity.  Cauda equina demonstrates no significant abnormalities noting limitation due to adjacent canal stenosis and lack of intravenous contrast.  Conus medullaris terminates at T12-L1.    There is moderate fatty infiltration of the posterior paraspinal musculature.  Partially seen lesion identified within the posterior right hepatic lobe, previously characterized as a hemangioma on MRI dated 10/16/2015.    T12-L1: No spinal canal stenosis or neural foraminal narrowing.    L1-L2: No spinal canal stenosis or neural foraminal narrowing.    L2-L3: There is a small circumferential disc bulge with small paracentral annular fissures.  There is mild bilateral facet hypertrophy.  Findings contribute to moderate right and mild left neural foraminal narrowing.  No spinal canal stenosis.    L3-L4: There is a small circumferential disc bulge.  There is moderate left and mild right facet hypertrophy.  Findings contribute to mild bilateral neural foraminal narrowing.  No spinal canal stenosis.    L4-L5: There is a moderate circumferential disc bulge with a posterior annular fissure.  There is severe bilateral facet hypertrophy with associated marrow edema and small posteriorly oriented synovial cysts.  There is moderate bilateral ligamentum flavum buckling.  Findings contribute to moderate spinal canal stenosis and moderate bilateral neural foraminal narrowing.    L5-S1: There is a small circumferential disc bulge.  There is mild bilateral facet hypertrophy.  Findings contribute to mild left-sided neural foraminal narrowing.  No spinal canal stenosis.    Allergies: Review of patient's allergies indicates:  No Known Allergies    Current Medications:   Current Outpatient Medications   Medication Sig Dispense Refill    ibuprofen (ADVIL,MOTRIN) 200 MG tablet Take 200 mg by mouth every 6 (six) hours as needed for Pain.      KRILL OIL ORAL Take by  mouth once daily.       multivitamin capsule Take 1 capsule by mouth once daily.      paroxetine (PAXIL) 10 MG tablet Take 1 tablet (10 mg total) by mouth once daily. 30 tablet 0    paroxetine (PAXIL) 20 MG tablet Take 1 tablet (20 mg total) by mouth once daily. 30 tablet 0     No current facility-administered medications for this visit.        REVIEW OF SYSTEMS:    GENERAL:  No weight loss, malaise or fevers.  HEENT:  Negative for frequent or significant headaches.  NECK:  Negative for pain and significant neck swelling.  RESPIRATORY:  Negative for wheezing or shortness of breath.  CARDIOVASCULAR:  Negative for chest pain, leg swelling or palpitations.  GI:  Negative for abdominal discomfort, blood in stools or black stools or change in bowel habits.  MUSCULOSKELETAL:  See HPI.  SKIN:  Negative for lesions, rash, and itching.  PSYCH: + anxiety  HEMATOLOGY/LYMPHOLOGY:  Negative for prolonged bleeding, bruising easily or swollen nodes.  NEURO:   No history of paralysis, seizures or tremors.  All other reviewed and negative other than HPI.    Past Medical History:  Past Medical History:   Diagnosis Date    Anxiety     Arthritis     Back pain     Hyperlipidemia     Thrombocytopenia        Past Surgical History:  Past Surgical History:   Procedure Laterality Date    CATARACT EXTRACTION W/  INTRAOCULAR LENS IMPLANT Right 08/28/2017    Dr. Barraza    CATARACT EXTRACTION W/  INTRAOCULAR LENS IMPLANT Left 09/18/2017    Dr Barraza     EYE SURGERY      INSERTION-INTRAOCULAR LENS (IOL) Left 9/18/2017    [facility]    INSERTION-INTRAOCULAR LENS (IOL) Right 8/28/2017    [facility]    PHACOEMULSIFICATION-ASPIRATION-CATARACT Left 9/18/2017    [facility]    PHACOEMULSIFICATION-ASPIRATION-CATARACT Right 8/28/2017    [facility]    THYROIDECTOMY, PARTIAL         Family History:  Family History   Problem  "Relation Age of Onset    Hyperlipidemia Father     Stroke Father     Cataracts Mother     Hypertension Sister     Diabetes Paternal Uncle     Cancer Maternal Grandmother     Cancer Paternal Grandmother     Colon cancer Neg Hx     Ovarian cancer Neg Hx     Breast cancer Neg Hx     Blindness Neg Hx     Amblyopia Neg Hx     Glaucoma Neg Hx     Macular degeneration Neg Hx     Retinal detachment Neg Hx     Strabismus Neg Hx     Thyroid disease Neg Hx        Social History:  Social History     Socioeconomic History    Marital status: Single     Spouse name: None    Number of children: None    Years of education: None    Highest education level: None   Social Needs    Financial resource strain: None    Food insecurity - worry: None    Food insecurity - inability: None    Transportation needs - medical: None    Transportation needs - non-medical: None   Occupational History    Occupation: nails    Tobacco Use    Smoking status: Current Every Day Smoker     Packs/day: 0.50     Types: Cigars    Smokeless tobacco: Current User    Tobacco comment: about 5 cigarettes  per day   Substance and Sexual Activity    Alcohol use: Yes     Alcohol/week: 12.6 oz     Types: 21 Shots of liquor per week     Comment: daily; wine or martinis    Drug use: No    Sexual activity: None   Other Topics Concern    None   Social History Narrative    None       OBJECTIVE:    BP (!) 167/91   Pulse 107   Temp 98.2 °F (36.8 °C)   Resp 18   Ht 5' 4" (1.626 m)   Wt 72.1 kg (159 lb)   SpO2 99%   BMI 27.29 kg/m²     PHYSICAL EXAMINATION:    GENERAL: Well appearing, in no acute distress.   PSYCH:  Mood and affect is appropriate.  Awake, alert, and oriented x 3.  SKIN: Skin color, texture, turgor normal, no rashes or lesions  HEENT: Normocephalic, atraumatic.  EOM intact.  CV: Radial pulses are 2+.  RESP:  Respirations are unlabored.  GI: Abdomen soft and non-tender.  MSK:  No atrophy or tone abnormalities are noted.    "              Neck: No pain with neck flexion, extension, or lateral rotation.  No obvious deformity or signs of trauma.  Normal cervical spine range of motion.     Back: Straight leg raising in the sitting and supine positions is negative for radicular pain. Tenderness to palpation over the lumbar paraspinous muscles.  No pain with facet loading and back extension/rotation. ROM is normal.     Extremities:  Peripheral joint ROM is full and pain free without obvious instability or laxity in all four extremities. No edema or skin discolorations noted.      Gait:  Gait is normal.     NEUR:  Bilateral lower extremity coordination and muscle stretch reflexes are physiologic and symmetric. Strength testing is 5/5 throughout all muscle groups in the upper and lower extremities. No loss of sensation is noted.       ASSESSMENT: 62 y.o.  female with chronic low back and bilateral leg pain, consistent with lumbar spinal stenosis. Also a likely component of facet arthropathy.     1. Degenerative lumbar spinal stenosis    2. DDD (degenerative disc disease), lumbar    3. Lumbar spondylosis          PLAN:     - I have stressed the importance of physical activity and a home exercise plan to help with pain and improve health.  - Schedule for a Repeat Bilateral L4/5 Transforaminal epidural steroid injection.  - Referral placed to physical therapy at the  Group.  - If no improvement with above would recommend following back up with spine surgery.  - RTC after procedure.    The above plan and management options were discussed at length with patient. Patient is in agreement with the above and verbalized understanding.    Eliot Rosas III  12/10/2018

## 2018-12-18 ENCOUNTER — TELEPHONE (OUTPATIENT)
Dept: PAIN MEDICINE | Facility: CLINIC | Age: 63
End: 2018-12-18

## 2018-12-18 NOTE — TELEPHONE ENCOUNTER
----- Message from Larisa Mays MA sent at 12/18/2018 12:56 PM CST -----  Contact: [phone]/self      ----- Message -----  From: Beronica Shah  Sent: 12/18/2018  10:24 AM  To: Ralph SUAREZ Staff    Pt states she was told she would be stating physical therapy but states she doesn't have any info on the dr she would be seeing, needs his info to see if hes in her network. Please call and advise.

## 2018-12-28 ENCOUNTER — HOSPITAL ENCOUNTER (OUTPATIENT)
Facility: HOSPITAL | Age: 63
Discharge: HOME | End: 2018-12-28
Attending: ANESTHESIOLOGY | Admitting: ANESTHESIOLOGY
Payer: COMMERCIAL

## 2018-12-28 VITALS
HEART RATE: 74 BPM | SYSTOLIC BLOOD PRESSURE: 132 MMHG | RESPIRATION RATE: 18 BRPM | OXYGEN SATURATION: 92 % | BODY MASS INDEX: 26.98 KG/M2 | TEMPERATURE: 98 F | HEIGHT: 64 IN | DIASTOLIC BLOOD PRESSURE: 73 MMHG | WEIGHT: 158 LBS

## 2018-12-28 DIAGNOSIS — M51.36 DEGENERATION, INTERVERTEBRAL DISC, LUMBAR: Primary | ICD-10-CM

## 2018-12-28 PROBLEM — M48.061 DEGENERATIVE LUMBAR SPINAL STENOSIS: Status: ACTIVE | Noted: 2018-12-28

## 2018-12-28 PROCEDURE — 63600175 PHARM REV CODE 636 W HCPCS: Performed by: ANESTHESIOLOGY

## 2018-12-28 PROCEDURE — 64483 NJX AA&/STRD TFRM EPI L/S 1: CPT | Mod: 50,,, | Performed by: ANESTHESIOLOGY

## 2018-12-28 PROCEDURE — 25500020 PHARM REV CODE 255: Performed by: ANESTHESIOLOGY

## 2018-12-28 PROCEDURE — 25000003 PHARM REV CODE 250: Performed by: ANESTHESIOLOGY

## 2018-12-28 PROCEDURE — 99152 PR MOD CONSCIOUS SEDATION, SAME PHYS, 5+ YRS, FIRST 15 MIN: ICD-10-PCS | Mod: ,,, | Performed by: ANESTHESIOLOGY

## 2018-12-28 PROCEDURE — 64483 PR EPIDURAL INJ, ANES/STEROID, TRANSFORAMINAL, LUMB/SACR, SNGL LEVL: ICD-10-PCS | Mod: 50,,, | Performed by: ANESTHESIOLOGY

## 2018-12-28 PROCEDURE — 99152 MOD SED SAME PHYS/QHP 5/>YRS: CPT | Mod: ,,, | Performed by: ANESTHESIOLOGY

## 2018-12-28 PROCEDURE — 64483 NJX AA&/STRD TFRM EPI L/S 1: CPT

## 2018-12-28 RX ORDER — MIDAZOLAM HYDROCHLORIDE 1 MG/ML
INJECTION, SOLUTION INTRAMUSCULAR; INTRAVENOUS
Status: DISCONTINUED | OUTPATIENT
Start: 2018-12-28 | End: 2018-12-28 | Stop reason: HOSPADM

## 2018-12-28 RX ORDER — LIDOCAINE HYDROCHLORIDE 10 MG/ML
INJECTION INFILTRATION; PERINEURAL
Status: DISCONTINUED | OUTPATIENT
Start: 2018-12-28 | End: 2018-12-28 | Stop reason: HOSPADM

## 2018-12-28 RX ORDER — METHYLPREDNISOLONE ACETATE 40 MG/ML
INJECTION, SUSPENSION INTRA-ARTICULAR; INTRALESIONAL; INTRAMUSCULAR; SOFT TISSUE
Status: DISCONTINUED | OUTPATIENT
Start: 2018-12-28 | End: 2018-12-28 | Stop reason: HOSPADM

## 2018-12-28 RX ORDER — SODIUM CHLORIDE 9 MG/ML
500 INJECTION, SOLUTION INTRAVENOUS CONTINUOUS
Status: DISCONTINUED | OUTPATIENT
Start: 2018-12-28 | End: 2018-12-28 | Stop reason: HOSPADM

## 2018-12-28 RX ORDER — FENTANYL CITRATE 50 UG/ML
INJECTION, SOLUTION INTRAMUSCULAR; INTRAVENOUS
Status: DISCONTINUED | OUTPATIENT
Start: 2018-12-28 | End: 2018-12-28 | Stop reason: HOSPADM

## 2018-12-28 NOTE — PROGRESS NOTES
Pt is AAOx3 and in no apparent distress.  Provided a copy of discharge instructions.  Teaching performed.  Pt verbalized understanding and denied any questions.  PIV d/c catheter tip intact.  2x2 applied and no active bleeding noted.  Pt waiting for wheelchair to escort to garage.  Family member at the bedside.

## 2018-12-28 NOTE — OP NOTE
Patient Name: Carla Aguilar  MRN: [id number]    INFORMED CONSENT: The procedure, risks, benefits and options were discussed with patient. There are no contraindications to the procedure. The patient expressed understanding and agreed to proceed. The personnel performing the procedure was discussed. I verify that I personally obtained the patient's consent prior to the start of the procedure and the signed consent can be found on the patient's chart.    PROCEDURE: Bilateral L4/5 TRANSFORAMINAL EPIDURAL STEROID INJECTION    Procedure Date: 12/28/2018  Surgeon(s) and Role:     * Eliot Rosas III, MD - Primary  Anesthesia: RN IV Sedation  Procedure(s) (LRB):  INJECTION, STEROID, EPIDURAL, TRANSFORAMINAL APPROACH. flores TESI L4/5 (Bilateral)    Pre Procedure diagnosis:   1) Lumbar DDD  2) Lumbar Spinal Stenosis    Post-Procedure diagnosis: SAME    Sedation: Yes - Fentanyl 50 mcg and Midazolam 1 mg    DESCRIPTION OF PROCEDURE: The patient was brought to the procedure room. IV access was obtained prior to the procedure. The patient was positioned prone on the fluoroscopy table. Continuous hemodynamic monitoring was initiated including blood pressure, EKG, and pulse oximetry.  The skin was prepped with chlorhexidine three times and draped in a sterile fashion. Skin anesthesia was achieved using 5 mL of lidocaine 1% over the respective injection sites.     An oblique fluoroscopic view was obtained, with the superior articular process of the inferior vertebral body aligned with the pedicle. The tip of a 22-gauge 5-inch Quincke-type spinal needle was advanced toward the 6 oclock position of the pedicle under intermittent fluoroscopic guidance. Confirmation of proper needle position was made with AP, oblique, and lateral fluoroscopic views. Negative aspiration for blood or CSF was confirmed. 0.5 mL of Omnipaque 300 was injected at each side. Live fluoroscopic imaging revealed a clear outline of the spinal nerve with  proximal spread of agent through the neural foramen into the anterior epidural space. 2 mL of Lidocaine 1% and 20 mg Depo-Medrol was injected at each side. The needles were removed and bleeding was nil.  A sterile dressing was applied. Carla was taken back to the recovery room for further observation.     Blood Loss: Nil

## 2018-12-28 NOTE — PROGRESS NOTES
Patient discharged per MD orders. Instructions given on medications, wound care, activity, signs of infection, when to call MD, and follow up appointments. Pt verbalized understanding.  Patient AAOx3, VSS, no c/o pain or discomfort at this time.  PIV removed. Patient left unit via wheelchair with transport and family.

## 2018-12-28 NOTE — DISCHARGE SUMMARY
Discharge Note  Short Stay      SUMMARY     Admit Date: 12/28/2018    Attending Physician: Eliot Rosas III      Discharge Physician: Eliot Rosas III      Discharge Date: 12/28/2018 9:17 AM    Final Diagnosis:   1. Degeneration, intervertebral disc, lumbar        Disposition: Home or self care    Patient Instructions:   Current Discharge Medication List      CONTINUE these medications which have NOT CHANGED    Details   ibuprofen (ADVIL,MOTRIN) 200 MG tablet Take 200 mg by mouth every 6 (six) hours as needed for Pain.      KRILL OIL ORAL Take by mouth once daily.       multivitamin capsule Take 1 capsule by mouth once daily.      !! paroxetine (PAXIL) 10 MG tablet Take 1 tablet (10 mg total) by mouth once daily.  Qty: 30 tablet, Refills: 0      !! paroxetine (PAXIL) 20 MG tablet Take 1 tablet (20 mg total) by mouth once daily.  Qty: 30 tablet, Refills: 0    Associated Diagnoses: Anxiety       !! - Potential duplicate medications found. Please discuss with provider.              Discharge Diagnosis: Same as Pre and Post Procedure  Condition on Discharge: Stable.  Diet on Discharge: Same as before.  Activity: as per instruction sheet.  Discharge to: Home with a responsible adult.  Follow up: 1 month

## 2018-12-28 NOTE — PROGRESS NOTES
Pt returned to unit AAOx4 and denies pain.  Band aids to lower back are c/d/i without redness or swelling.  VSS.  Family member at bedside.  Will continue to monitor.

## 2018-12-28 NOTE — INTERVAL H&P NOTE
The patient has been examined and the H&P has been reviewed:    I concur with the findings and no changes have occurred since H&P was written.    Anesthesia/Surgery risks, benefits and alternative options discussed and understood by patient/family.          Active Hospital Problems    Diagnosis  POA    Degeneration, intervertebral disc, lumbar [M51.36]  Yes      Resolved Hospital Problems   No resolved problems to display.

## 2018-12-28 NOTE — DISCHARGE INSTRUCTIONS

## 2019-04-17 ENCOUNTER — TELEPHONE (OUTPATIENT)
Dept: INTERNAL MEDICINE | Facility: CLINIC | Age: 64
End: 2019-04-17

## 2019-04-17 DIAGNOSIS — Z00.00 ANNUAL PHYSICAL EXAM: Primary | ICD-10-CM

## 2019-04-17 NOTE — TELEPHONE ENCOUNTER
----- Message from Neeru Orona LPN sent at 4/17/2019  3:45 PM CDT -----  Contact: self/[phone]      ----- Message -----  From: Dania Kent  Sent: 4/17/2019   3:44 PM  To: Hosea Andino Staff    Pt called in regards to getting her epp lab orders put into the system. He appointment is on  7-3-19.    Please advise

## 2019-05-31 ENCOUNTER — TELEPHONE (OUTPATIENT)
Dept: INTERNAL MEDICINE | Facility: CLINIC | Age: 64
End: 2019-05-31

## 2019-05-31 DIAGNOSIS — F41.9 ANXIETY: ICD-10-CM

## 2019-05-31 RX ORDER — PAROXETINE HYDROCHLORIDE 20 MG/1
20 TABLET, FILM COATED ORAL DAILY
Qty: 30 TABLET | Refills: 1 | Status: SHIPPED | OUTPATIENT
Start: 2019-05-31 | End: 2019-06-28 | Stop reason: SDUPTHER

## 2019-05-31 RX ORDER — PAROXETINE 10 MG/1
TABLET, FILM COATED ORAL
Qty: 30 TABLET | Refills: 0 | OUTPATIENT
Start: 2019-05-31

## 2019-05-31 RX ORDER — PAROXETINE HYDROCHLORIDE 20 MG/1
TABLET, FILM COATED ORAL
Qty: 30 TABLET | Refills: 0 | OUTPATIENT
Start: 2019-05-31

## 2019-05-31 RX ORDER — PAROXETINE 10 MG/1
10 TABLET, FILM COATED ORAL DAILY
Qty: 30 TABLET | Refills: 1 | Status: SHIPPED | OUTPATIENT
Start: 2019-05-31 | End: 2019-06-28 | Stop reason: SDUPTHER

## 2019-05-31 NOTE — TELEPHONE ENCOUNTER
----- Message from Giana Mcmillan sent at 5/31/2019  9:13 AM CDT -----  Contact: self    Patient states the pharmacy called her saying her Rx for paroxetine (PAXIL) 10 MG tablet & paroxetine (PAXIL) 20 MG tablet  was denied. Patient states she has an appt scheduled for 7/11. Please call & advise

## 2019-06-25 ENCOUNTER — TELEPHONE (OUTPATIENT)
Dept: INTERNAL MEDICINE | Facility: CLINIC | Age: 64
End: 2019-06-25

## 2019-06-25 NOTE — TELEPHONE ENCOUNTER
----- Message from Annmarie Posada sent at 6/25/2019  4:48 PM CDT -----  Contact: Pt self Mobile/Home [phone]   Patient is returning a phone call.  Who left a message for the patient: Mary Coronado    Does patient know what this is regarding:  A message from Mary Coronado

## 2019-06-25 NOTE — TELEPHONE ENCOUNTER
----- Message from Tra Wilson sent at 6/25/2019  4:26 PM CDT -----  Contact: Self [phone]  Pt would like an call back from the nurse in regards to Medication, pt states she do not have health insurance and will like to get an refill on medication up until December 2019.

## 2019-06-25 NOTE — TELEPHONE ENCOUNTER
Patient state that she will losing coverage of insurance July 1 2019, patient stated that she won't have medical coverage until December. Patient stated that she is taking the paroxetine 10 mg and 20 mg. Patient is requesting an office visit before July 1 or she would like to have medication refills until she is able to get medication?

## 2019-06-27 NOTE — PROGRESS NOTES
Subjective:       Patient ID: Carla Aguilar is a 63 y.o. female.    Chief Complaint: Annual Exam    Patient is a 63 y.o.female who presents today for annual    Cholesterol: due now  Vaccines: Influenza (yearly); Tetanus (2016) ; Zoster (done)  Eye exam: due now  Mammogram: due now  Gyn exam: due now  Colonoscopy: has fitkit at home; declines colonoscopy    Pt losing her insurance July 1st. She declines preventative screenings until she can get medicare in January. She understands risks in putting these screenings off until then. She will schedule her labs to be done tomorrow prior to her insurance lapse.    Review of Systems   Constitutional: Negative for appetite change, chills, diaphoresis and fever.   HENT: Negative for congestion, ear discharge, ear pain, postnasal drip, tinnitus, trouble swallowing and voice change.    Eyes: Negative for discharge, redness and itching.   Respiratory: Negative for cough, chest tightness, shortness of breath and wheezing.    Cardiovascular: Negative for chest pain, palpitations and leg swelling.   Gastrointestinal: Negative for abdominal pain, constipation, diarrhea, nausea and vomiting.   Endocrine: Negative for cold intolerance and heat intolerance.   Genitourinary: Negative for difficulty urinating, flank pain, hematuria and urgency.   Musculoskeletal: Negative for arthralgias, gait problem, myalgias and neck stiffness.   Skin: Negative for color change and rash.   Neurological: Negative for dizziness, seizures, syncope and headaches.   Hematological: Negative for adenopathy.   Psychiatric/Behavioral: Negative for agitation, behavioral problems, confusion and sleep disturbance.       Objective:      Physical Exam   Constitutional: She is oriented to person, place, and time. She appears well-developed and well-nourished. No distress.   HENT:   Head: Normocephalic and atraumatic.   Right Ear: External ear normal.   Left Ear: External ear normal.   Nose: Nose normal.    Mouth/Throat: Oropharynx is clear and moist. No oropharyngeal exudate.   Eyes: Pupils are equal, round, and reactive to light. Conjunctivae and EOM are normal. Right eye exhibits no discharge. Left eye exhibits no discharge. No scleral icterus.   Neck: Neck supple. No JVD present. No tracheal deviation present. No thyromegaly present.   Cardiovascular: Normal rate, normal heart sounds and intact distal pulses. Exam reveals no gallop and no friction rub.   No murmur heard.  Pulmonary/Chest: Effort normal and breath sounds normal. No stridor. No respiratory distress. She has no wheezes. She has no rales. She exhibits no tenderness.   Abdominal: Soft. Bowel sounds are normal. She exhibits no distension. There is no tenderness. There is no rebound.   Musculoskeletal: She exhibits no edema or tenderness.   Lymphadenopathy:     She has no cervical adenopathy.   Neurological: She is alert and oriented to person, place, and time.   Skin: Skin is warm and dry. No rash noted. She is not diaphoretic. No erythema.   Psychiatric: She has a normal mood and affect. Her behavior is normal.   Nursing note and vitals reviewed.      Assessment and Plan:       1. Annual physical exam  - declines all preventative screenings for now except labs as her insurance ends in two days. Understands risks in doing so. Will book f/u in January to get these screenings done.  - CBC auto differential; Future  - Comprehensive metabolic panel; Future  - Lipid panel; Future  - Vitamin D; Future  - TSH; Future  - Urinalysis; Future    2. TIERRA (generalized anxiety disorder)  - stable on paxil  - CBC auto differential; Future  - Comprehensive metabolic panel; Future  - Lipid panel; Future  - Vitamin D; Future  - TSH; Future  - Urinalysis; Future    3. Anxiety  - stable on paxil 30 mg dialy  - paroxetine (PAXIL) 20 MG tablet; Take 1 tablet (20 mg total) by mouth once daily.  Dispense: 30 tablet; Refill: 11          No follow-ups on file.

## 2019-06-28 ENCOUNTER — OFFICE VISIT (OUTPATIENT)
Dept: INTERNAL MEDICINE | Facility: CLINIC | Age: 64
End: 2019-06-28
Payer: COMMERCIAL

## 2019-06-28 VITALS
HEIGHT: 64 IN | HEART RATE: 88 BPM | DIASTOLIC BLOOD PRESSURE: 78 MMHG | WEIGHT: 163.81 LBS | SYSTOLIC BLOOD PRESSURE: 134 MMHG | BODY MASS INDEX: 27.96 KG/M2 | RESPIRATION RATE: 18 BRPM | TEMPERATURE: 99 F

## 2019-06-28 DIAGNOSIS — Z00.00 ANNUAL PHYSICAL EXAM: Primary | ICD-10-CM

## 2019-06-28 DIAGNOSIS — F41.1 GAD (GENERALIZED ANXIETY DISORDER): ICD-10-CM

## 2019-06-28 DIAGNOSIS — F41.9 ANXIETY: ICD-10-CM

## 2019-06-28 PROBLEM — F10.20 ALCOHOL USE DISORDER, MODERATE, DEPENDENCE: Status: RESOLVED | Noted: 2018-09-21 | Resolved: 2019-06-28

## 2019-06-28 PROBLEM — F10.10 ALCOHOL ABUSE: Status: RESOLVED | Noted: 2018-05-29 | Resolved: 2019-06-28

## 2019-06-28 PROCEDURE — 99999 PR PBB SHADOW E&M-EST. PATIENT-LVL III: CPT | Mod: PBBFAC,,, | Performed by: INTERNAL MEDICINE

## 2019-06-28 PROCEDURE — 99396 PREV VISIT EST AGE 40-64: CPT | Mod: S$GLB,,, | Performed by: INTERNAL MEDICINE

## 2019-06-28 PROCEDURE — 99999 PR PBB SHADOW E&M-EST. PATIENT-LVL III: ICD-10-PCS | Mod: PBBFAC,,, | Performed by: INTERNAL MEDICINE

## 2019-06-28 PROCEDURE — 99396 PR PREVENTIVE VISIT,EST,40-64: ICD-10-PCS | Mod: S$GLB,,, | Performed by: INTERNAL MEDICINE

## 2019-06-28 RX ORDER — PAROXETINE 10 MG/1
10 TABLET, FILM COATED ORAL DAILY
Qty: 30 TABLET | Refills: 11 | Status: SHIPPED | OUTPATIENT
Start: 2019-06-28 | End: 2020-05-17 | Stop reason: SDUPTHER

## 2019-06-28 RX ORDER — PAROXETINE HYDROCHLORIDE 20 MG/1
20 TABLET, FILM COATED ORAL DAILY
Qty: 30 TABLET | Refills: 11 | Status: SHIPPED | OUTPATIENT
Start: 2019-06-28 | End: 2020-05-17 | Stop reason: SDUPTHER

## 2019-11-22 DIAGNOSIS — Z12.11 COLON CANCER SCREENING: ICD-10-CM

## 2020-05-12 ENCOUNTER — TELEPHONE (OUTPATIENT)
Dept: INTERNAL MEDICINE | Facility: CLINIC | Age: 65
End: 2020-05-12

## 2020-05-12 NOTE — TELEPHONE ENCOUNTER
Spoke to pt and made her a virtual apt for her med refill and she want's to postpone her annual due to covid

## 2020-05-12 NOTE — TELEPHONE ENCOUNTER
----- Message from Verenice Villalba sent at 5/12/2020  4:14 PM CDT -----  Contact: self/[phone]  Patient called in regard needing to find out if is possible for her to have a refill on medication since she does not feel to come in to have her annual done in June. Please call and advise. thank you

## 2020-05-17 ENCOUNTER — OFFICE VISIT (OUTPATIENT)
Dept: INTERNAL MEDICINE | Facility: CLINIC | Age: 65
End: 2020-05-17
Payer: MEDICARE

## 2020-05-17 DIAGNOSIS — F41.9 ANXIETY: ICD-10-CM

## 2020-05-17 DIAGNOSIS — Z76.0 MEDICATION REFILL: Primary | ICD-10-CM

## 2020-05-17 PROCEDURE — 99441 PR PHYSICIAN TELEPHONE EVALUATION 5-10 MIN: ICD-10-PCS | Mod: HCNC,95,, | Performed by: FAMILY MEDICINE

## 2020-05-17 PROCEDURE — 99441 PR PHYSICIAN TELEPHONE EVALUATION 5-10 MIN: CPT | Mod: HCNC,95,, | Performed by: FAMILY MEDICINE

## 2020-05-17 RX ORDER — PAROXETINE 10 MG/1
10 TABLET, FILM COATED ORAL DAILY
Qty: 30 TABLET | Refills: 11 | Status: SHIPPED | OUTPATIENT
Start: 2020-05-17 | End: 2021-05-17

## 2020-05-17 RX ORDER — PAROXETINE HYDROCHLORIDE 20 MG/1
20 TABLET, FILM COATED ORAL DAILY
Qty: 30 TABLET | Refills: 11 | Status: SHIPPED | OUTPATIENT
Start: 2020-05-17 | End: 2021-05-17

## 2020-05-17 NOTE — PROGRESS NOTES
Established Patient - Audio Only Telehealth Visit     The patient location is: Home  The chief complaint leading to consultation is: Medication refill  Visit type: Virtual visit with audio only (telephone)  Total time spent with patient: 5 minutes       The reason for the audio only service rather than synchronous audio and video virtual visit was related to technical difficulties or patient preference/necessity.     Each patient to whom I provide medical services by telemedicine is:  (1) informed of the relationship between the physician and patient and the respective role of any other health care provider with respect to management of the patient; and (2) notified that they may decline to receive medical services by telemedicine and may withdraw from such care at any time. Patient verbally consented to receive this service via voice-only telephone call.       HPI:  64-year-old white female patient of Dr. Burton is evaluated through audio visit in order to obtain medication refills for Paxil 30 mg daily which he has been on for many years for treatment of anxiety.  She currently reports that she is attempting to wean off the medication as she feels overall well and interested in decreasing her dose.  She is currently on 25 mg daily without issue.     Assessment:  1.  Medication refills  2.  Anxiety    Plan:  1.  Refills of both Paxil 10 mg daily and Paxil 20 mg daily have been sent to the patient's pharmacy to continue daily.  The patient is currently on 25 mg daily and attempting taper.  2.  Follow-up as needed or as scheduled for physical exam with PCP.         This service was not originating from a related E/M service provided within the previous 7 days nor will  to an E/M service or procedure within the next 24 hours or my soonest available appointment.  Prevailing standard of care was able to be met in this audio-only visit.

## 2020-10-01 ENCOUNTER — LAB VISIT (OUTPATIENT)
Dept: LAB | Facility: HOSPITAL | Age: 65
End: 2020-10-01
Attending: INTERNAL MEDICINE
Payer: MEDICARE

## 2020-10-01 DIAGNOSIS — F41.1 GAD (GENERALIZED ANXIETY DISORDER): ICD-10-CM

## 2020-10-01 DIAGNOSIS — Z00.00 ANNUAL PHYSICAL EXAM: ICD-10-CM

## 2020-10-01 LAB
ALBUMIN SERPL BCP-MCNC: 4.3 G/DL (ref 3.5–5.2)
ALP SERPL-CCNC: 90 U/L (ref 55–135)
ALT SERPL W/O P-5'-P-CCNC: 87 U/L (ref 10–44)
ANION GAP SERPL CALC-SCNC: 14 MMOL/L (ref 8–16)
AST SERPL-CCNC: 86 U/L (ref 10–40)
BASOPHILS # BLD AUTO: 0.04 K/UL (ref 0–0.2)
BASOPHILS NFR BLD: 0.7 % (ref 0–1.9)
BILIRUB SERPL-MCNC: 0.9 MG/DL (ref 0.1–1)
BUN SERPL-MCNC: 12 MG/DL (ref 8–23)
CALCIUM SERPL-MCNC: 10.2 MG/DL (ref 8.7–10.5)
CHLORIDE SERPL-SCNC: 101 MMOL/L (ref 95–110)
CHOLEST SERPL-MCNC: 289 MG/DL (ref 120–199)
CHOLEST/HDLC SERPL: 2.9 {RATIO} (ref 2–5)
CO2 SERPL-SCNC: 26 MMOL/L (ref 23–29)
CREAT SERPL-MCNC: 0.8 MG/DL (ref 0.5–1.4)
DIFFERENTIAL METHOD: ABNORMAL
EOSINOPHIL # BLD AUTO: 0.1 K/UL (ref 0–0.5)
EOSINOPHIL NFR BLD: 0.9 % (ref 0–8)
ERYTHROCYTE [DISTWIDTH] IN BLOOD BY AUTOMATED COUNT: 13.1 % (ref 11.5–14.5)
EST. GFR  (AFRICAN AMERICAN): >60 ML/MIN/1.73 M^2
EST. GFR  (NON AFRICAN AMERICAN): >60 ML/MIN/1.73 M^2
GLUCOSE SERPL-MCNC: 128 MG/DL (ref 70–110)
HCT VFR BLD AUTO: 45.5 % (ref 37–48.5)
HDLC SERPL-MCNC: 99 MG/DL (ref 40–75)
HDLC SERPL: 34.3 % (ref 20–50)
HGB BLD-MCNC: 15.3 G/DL (ref 12–16)
IMM GRANULOCYTES # BLD AUTO: 0.01 K/UL (ref 0–0.04)
IMM GRANULOCYTES NFR BLD AUTO: 0.2 % (ref 0–0.5)
LDLC SERPL CALC-MCNC: 169.8 MG/DL (ref 63–159)
LYMPHOCYTES # BLD AUTO: 2.1 K/UL (ref 1–4.8)
LYMPHOCYTES NFR BLD: 39.2 % (ref 18–48)
MCH RBC QN AUTO: 37.6 PG (ref 27–31)
MCHC RBC AUTO-ENTMCNC: 33.6 G/DL (ref 32–36)
MCV RBC AUTO: 112 FL (ref 82–98)
MONOCYTES # BLD AUTO: 0.9 K/UL (ref 0.3–1)
MONOCYTES NFR BLD: 16.2 % (ref 4–15)
NEUTROPHILS # BLD AUTO: 2.3 K/UL (ref 1.8–7.7)
NEUTROPHILS NFR BLD: 42.8 % (ref 38–73)
NONHDLC SERPL-MCNC: 190 MG/DL
NRBC BLD-RTO: 0 /100 WBC
PLATELET # BLD AUTO: 133 K/UL (ref 150–350)
PMV BLD AUTO: 12.2 FL (ref 9.2–12.9)
POTASSIUM SERPL-SCNC: 3.9 MMOL/L (ref 3.5–5.1)
PROT SERPL-MCNC: 7.4 G/DL (ref 6–8.4)
RBC # BLD AUTO: 4.07 M/UL (ref 4–5.4)
SODIUM SERPL-SCNC: 141 MMOL/L (ref 136–145)
TRIGL SERPL-MCNC: 101 MG/DL (ref 30–150)
TSH SERPL DL<=0.005 MIU/L-ACNC: 2.18 UIU/ML (ref 0.4–4)
WBC # BLD AUTO: 5.44 K/UL (ref 3.9–12.7)

## 2020-10-01 PROCEDURE — 84443 ASSAY THYROID STIM HORMONE: CPT | Mod: HCNC

## 2020-10-01 PROCEDURE — 80053 COMPREHEN METABOLIC PANEL: CPT | Mod: HCNC

## 2020-10-01 PROCEDURE — 36415 COLL VENOUS BLD VENIPUNCTURE: CPT | Mod: HCNC,PO

## 2020-10-01 PROCEDURE — 82306 VITAMIN D 25 HYDROXY: CPT | Mod: HCNC

## 2020-10-01 PROCEDURE — 80061 LIPID PANEL: CPT | Mod: HCNC

## 2020-10-01 PROCEDURE — 85025 COMPLETE CBC W/AUTO DIFF WBC: CPT | Mod: HCNC

## 2020-10-02 LAB — 25(OH)D3+25(OH)D2 SERPL-MCNC: 46 NG/ML (ref 30–96)

## 2020-11-16 PROBLEM — R73.01 IMPAIRED FASTING BLOOD SUGAR: Status: ACTIVE | Noted: 2020-11-16

## 2020-11-16 PROBLEM — R79.89 ELEVATED LFTS: Status: ACTIVE | Noted: 2020-11-16

## 2020-11-16 NOTE — PROGRESS NOTES
Subjective:       Patient ID: Carla Aguilar is a 64 y.o. female.    Chief Complaint: No chief complaint on file.    Patient is a 64 y.o.female with alcoholic liver disease who presents today for follow up    Established Patient - Audio Only Telehealth Visit     The patient location is: follow up  The chief complaint leading to consultation is: follow up  Visit type: Virtual visit with audio only (telephone)  Total time spent with patient: 20 min       The reason for the audio only service rather than synchronous audio and video virtual visit was related to technical difficulties or patient preference/necessity.     Each patient to whom I provide medical services by telemedicine is:  (1) informed of the relationship between the physician and patient and the respective role of any other health care provider with respect to management of the patient; and (2) notified that they may decline to receive medical services by telemedicine and may withdraw from such care at any time. Patient verbally consented to receive this service via voice-only telephone call.       HPI:     Labs: reviewed     Mammo: due now    Dexa: due now    Colon: due now; declines but will do fitkit      Diet: no red meat  Exercise: none due to back pain      TIERRA: taking 20 mg of paxil.        This service was not originating from a related E/M service provided within the previous 7 days nor will  to an E/M service or procedure within the next 24 hours or my soonest available appointment.  Prevailing standard of care was able to be met in this audio-only visit.          Review of Systems   Constitutional: Negative for appetite change, chills, diaphoresis and fever.   HENT: Negative for congestion, ear discharge, ear pain, postnasal drip, tinnitus, trouble swallowing and voice change.    Eyes: Negative for discharge, redness and itching.   Respiratory: Negative for cough, chest tightness, shortness of breath and wheezing.    Cardiovascular:  Negative for chest pain, palpitations and leg swelling.   Gastrointestinal: Negative for abdominal pain, constipation, diarrhea, nausea and vomiting.   Endocrine: Negative for cold intolerance and heat intolerance.   Genitourinary: Negative for difficulty urinating, flank pain, hematuria and urgency.   Musculoskeletal: Negative for arthralgias, gait problem, myalgias and neck stiffness.   Skin: Negative for color change and rash.   Neurological: Negative for dizziness, seizures, syncope and headaches.   Hematological: Negative for adenopathy.   Psychiatric/Behavioral: Negative for agitation, behavioral problems, confusion and sleep disturbance.       Objective:      Physical Exam  Psychiatric:         Mood and Affect: Mood normal.         Behavior: Behavior normal.         Thought Content: Thought content normal.         Judgment: Judgment normal.         Assessment and Plan:       1. Elevated LFTs  Admits to drinking too much alcohol  Will try to wean down; trend and check US  Discussed low fat diet  - Comprehensive Metabolic Panel; Future  - US Abdomen Limited; Future    2. Impaired fasting blood sugar    - Hemoglobin A1C; Future    3. Anxiety  Taking paxil; increasing paxil 30 mg; pt was only taking 20 mg    4. Visit for screening mammogram    - Mammo Digital Screening Bilat w/ Chase; Future    5. Postmenopausal    - DXA Bone Density Spine And Hip; Future    6. Screen for colon cancer    - Fecal Immunochemical Test (iFOBT); Future    Will try melatonin.        No follow-ups on file.

## 2020-11-17 ENCOUNTER — OFFICE VISIT (OUTPATIENT)
Dept: INTERNAL MEDICINE | Facility: CLINIC | Age: 65
End: 2020-11-17
Payer: MEDICARE

## 2020-11-17 DIAGNOSIS — R79.89 ELEVATED LFTS: ICD-10-CM

## 2020-11-17 DIAGNOSIS — Z12.31 VISIT FOR SCREENING MAMMOGRAM: ICD-10-CM

## 2020-11-17 DIAGNOSIS — K70.9 ALCOHOLIC LIVER DISEASE: ICD-10-CM

## 2020-11-17 DIAGNOSIS — F41.9 ANXIETY: Primary | ICD-10-CM

## 2020-11-17 DIAGNOSIS — R73.01 IMPAIRED FASTING BLOOD SUGAR: ICD-10-CM

## 2020-11-17 DIAGNOSIS — Z78.0 POSTMENOPAUSAL: ICD-10-CM

## 2020-11-17 DIAGNOSIS — Z12.11 SCREEN FOR COLON CANCER: ICD-10-CM

## 2020-11-17 PROCEDURE — 99442 PR PHYSICIAN TELEPHONE EVALUATION 11-20 MIN: ICD-10-PCS | Mod: HCNC,95,, | Performed by: INTERNAL MEDICINE

## 2020-11-17 PROCEDURE — 99442 PR PHYSICIAN TELEPHONE EVALUATION 11-20 MIN: CPT | Mod: HCNC,95,, | Performed by: INTERNAL MEDICINE

## 2020-11-24 ENCOUNTER — TELEPHONE (OUTPATIENT)
Dept: INTERNAL MEDICINE | Facility: CLINIC | Age: 65
End: 2020-11-24

## 2020-11-24 NOTE — TELEPHONE ENCOUNTER
----- Message from Freda Wooten DO sent at 11/17/2020  1:21 PM CST -----  Schedule cmp and a1c, mammo, dexa, send fitkit; mail low cholesterol diet

## 2020-12-18 ENCOUNTER — LAB VISIT (OUTPATIENT)
Dept: LAB | Facility: HOSPITAL | Age: 65
End: 2020-12-18
Attending: INTERNAL MEDICINE
Payer: MEDICARE

## 2020-12-18 DIAGNOSIS — Z12.11 SCREEN FOR COLON CANCER: ICD-10-CM

## 2020-12-18 PROCEDURE — 82274 ASSAY TEST FOR BLOOD FECAL: CPT | Mod: HCNC

## 2020-12-21 ENCOUNTER — TELEPHONE (OUTPATIENT)
Dept: INTERNAL MEDICINE | Facility: CLINIC | Age: 65
End: 2020-12-21

## 2020-12-21 NOTE — TELEPHONE ENCOUNTER
----- Message from Kelin Moore sent at 12/19/2020 12:25 PM CST -----  Type:  Needs Medical Advice    Who Called: patient  Reason for call: Wants to know what blood type she is.  Would the patient rather a call back or a response via [facility]? callback  Best Call Back Number: [phone]  Additional Information:

## 2020-12-22 NOTE — TELEPHONE ENCOUNTER
Pt called back, advised that blood type has never been drawn at [facility] as it is not part of our routine labs. She verbalized understanding.

## 2020-12-29 ENCOUNTER — TELEPHONE (OUTPATIENT)
Dept: INTERNAL MEDICINE | Facility: CLINIC | Age: 65
End: 2020-12-29

## 2020-12-29 NOTE — TELEPHONE ENCOUNTER
----- Message from Megan Henao sent at 12/29/2020  4:50 PM CST -----  Contact: self [phone]  Patient is returning a phone call.  Who left a message for the patient: Kelin  Does patient know what this is regarding:  Lab  Comments:

## 2020-12-31 ENCOUNTER — HOSPITAL ENCOUNTER (OUTPATIENT)
Dept: RADIOLOGY | Facility: HOSPITAL | Age: 65
Discharge: HOME | End: 2020-12-31
Attending: INTERNAL MEDICINE
Payer: MEDICARE

## 2020-12-31 DIAGNOSIS — Z12.31 VISIT FOR SCREENING MAMMOGRAM: ICD-10-CM

## 2020-12-31 LAB — HEMOCCULT STL QL IA: NEGATIVE

## 2020-12-31 PROCEDURE — 77067 SCR MAMMO BI INCL CAD: CPT | Mod: TC,HCNC,PO

## 2020-12-31 PROCEDURE — 77063 BREAST TOMOSYNTHESIS BI: CPT | Mod: 26,HCNC,, | Performed by: RADIOLOGY

## 2020-12-31 PROCEDURE — 77063 MAMMO DIGITAL SCREENING BILAT WITH TOMO: ICD-10-PCS | Mod: 26,HCNC,, | Performed by: RADIOLOGY

## 2020-12-31 PROCEDURE — 77067 SCR MAMMO BI INCL CAD: CPT | Mod: 26,HCNC,, | Performed by: RADIOLOGY

## 2020-12-31 PROCEDURE — 77067 MAMMO DIGITAL SCREENING BILAT WITH TOMO: ICD-10-PCS | Mod: 26,HCNC,, | Performed by: RADIOLOGY

## 2021-01-02 ENCOUNTER — TELEPHONE (OUTPATIENT)
Dept: INTERNAL MEDICINE | Facility: CLINIC | Age: 66
End: 2021-01-02

## 2021-01-02 DIAGNOSIS — M54.9 BACK PAIN, UNSPECIFIED BACK LOCATION, UNSPECIFIED BACK PAIN LATERALITY, UNSPECIFIED CHRONICITY: Primary | ICD-10-CM

## 2021-01-02 DIAGNOSIS — R79.89 ELEVATED LFTS: ICD-10-CM

## 2021-01-05 ENCOUNTER — APPOINTMENT (OUTPATIENT)
Dept: RADIOLOGY | Facility: CLINIC | Age: 66
End: 2021-01-05
Attending: INTERNAL MEDICINE
Payer: MEDICARE

## 2021-01-05 ENCOUNTER — TELEPHONE (OUTPATIENT)
Dept: INTERNAL MEDICINE | Facility: CLINIC | Age: 66
End: 2021-01-05

## 2021-01-05 DIAGNOSIS — Z78.0 POSTMENOPAUSAL: ICD-10-CM

## 2021-01-05 PROCEDURE — 77080 DEXA BONE DENSITY SPINE HIP: ICD-10-PCS | Mod: 26,HCNC,, | Performed by: INTERNAL MEDICINE

## 2021-01-05 PROCEDURE — 77080 DXA BONE DENSITY AXIAL: CPT | Mod: 26,HCNC,, | Performed by: INTERNAL MEDICINE

## 2021-01-05 PROCEDURE — 77080 DXA BONE DENSITY AXIAL: CPT | Mod: TC,HCNC,PO

## 2021-01-13 ENCOUNTER — TELEPHONE (OUTPATIENT)
Dept: INTERNAL MEDICINE | Facility: CLINIC | Age: 66
End: 2021-01-13

## 2021-01-21 ENCOUNTER — TELEPHONE (OUTPATIENT)
Dept: INTERNAL MEDICINE | Facility: CLINIC | Age: 66
End: 2021-01-21

## 2021-01-21 RX ORDER — ALENDRONATE SODIUM 70 MG/1
70 TABLET ORAL
Qty: 4 TABLET | Refills: 11 | Status: SHIPPED | OUTPATIENT
Start: 2021-01-21 | End: 2022-01-30 | Stop reason: SDUPTHER

## 2021-01-22 ENCOUNTER — OFFICE VISIT (OUTPATIENT)
Dept: INTERNAL MEDICINE | Facility: CLINIC | Age: 66
End: 2021-01-22
Payer: MEDICARE

## 2021-01-22 DIAGNOSIS — G47.00 INSOMNIA, UNSPECIFIED TYPE: Primary | ICD-10-CM

## 2021-01-22 DIAGNOSIS — F51.3 SLEEP WALKING: ICD-10-CM

## 2021-01-22 PROCEDURE — 99442 PR PHYSICIAN TELEPHONE EVALUATION 11-20 MIN: ICD-10-PCS | Mod: HCNC,95,, | Performed by: NURSE PRACTITIONER

## 2021-01-22 PROCEDURE — 99442 PR PHYSICIAN TELEPHONE EVALUATION 11-20 MIN: CPT | Mod: HCNC,95,, | Performed by: NURSE PRACTITIONER

## 2021-01-25 ENCOUNTER — TELEPHONE (OUTPATIENT)
Dept: INTERNAL MEDICINE | Facility: CLINIC | Age: 66
End: 2021-01-25

## 2021-01-25 DIAGNOSIS — M54.9 BACK PAIN, UNSPECIFIED BACK LOCATION, UNSPECIFIED BACK PAIN LATERALITY, UNSPECIFIED CHRONICITY: Primary | ICD-10-CM

## 2021-01-25 DIAGNOSIS — M25.559 HIP PAIN: ICD-10-CM

## 2021-02-02 ENCOUNTER — TELEPHONE (OUTPATIENT)
Dept: INTERNAL MEDICINE | Facility: CLINIC | Age: 66
End: 2021-02-02

## 2021-02-08 ENCOUNTER — TELEPHONE (OUTPATIENT)
Dept: INTERNAL MEDICINE | Facility: CLINIC | Age: 66
End: 2021-02-08

## 2021-03-30 ENCOUNTER — NURSE TRIAGE (OUTPATIENT)
Dept: ADMINISTRATIVE | Facility: CLINIC | Age: 66
End: 2021-03-30

## 2021-03-31 ENCOUNTER — OFFICE VISIT (OUTPATIENT)
Dept: URGENT CARE | Facility: CLINIC | Age: 66
End: 2021-03-31
Payer: MEDICARE

## 2021-03-31 VITALS
DIASTOLIC BLOOD PRESSURE: 82 MMHG | HEIGHT: 64 IN | WEIGHT: 160 LBS | BODY MASS INDEX: 27.31 KG/M2 | TEMPERATURE: 98 F | OXYGEN SATURATION: 93 % | SYSTOLIC BLOOD PRESSURE: 136 MMHG | RESPIRATION RATE: 12 BRPM | HEART RATE: 84 BPM

## 2021-03-31 DIAGNOSIS — S09.93XA INJURY OF INTRAORAL REGION, INITIAL ENCOUNTER: ICD-10-CM

## 2021-03-31 DIAGNOSIS — K13.0 CELLULITIS, LIP: ICD-10-CM

## 2021-03-31 DIAGNOSIS — S92.414A CLOSED NONDISPLACED FRACTURE OF PROXIMAL PHALANX OF RIGHT GREAT TOE, INITIAL ENCOUNTER: ICD-10-CM
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
## 2021-03-31 PROCEDURE — 70150 X-RAY EXAM OF FACIAL BONES: CPT | Mod: FY,S$GLB,, | Performed by: RADIOLOGY

## 2021-03-31 PROCEDURE — 99214 PR OFFICE/OUTPT VISIT, EST, LEVL IV, 30-39 MIN: ICD-10-PCS | Mod: S$GLB,,, | Performed by: PHYSICIAN ASSISTANT

## 2021-03-31 PROCEDURE — 99214 OFFICE O/P EST MOD 30 MIN: CPT | Mod: S$GLB,,, | Performed by: PHYSICIAN ASSISTANT

## 2021-03-31 PROCEDURE — 70150 XR FACIAL BONES 3 OR MORE VIEW: ICD-10-PCS | Mod: FY,S$GLB,, | Performed by: RADIOLOGY

## 2021-03-31 PROCEDURE — 73660 XR TOE 2 VIEW: ICD-10-PCS | Mod: FY,RT,S$GLB, | Performed by: RADIOLOGY

## 2021-03-31 PROCEDURE — 73660 X-RAY EXAM OF TOE(S): CPT | Mod: FY,RT,S$GLB, | Performed by: RADIOLOGY

## 2021-03-31 PROCEDURE — 3008F BODY MASS INDEX DOCD: CPT | Mod: CPTII,S$GLB,, | Performed by: PHYSICIAN ASSISTANT

## 2021-03-31 PROCEDURE — 3008F PR BODY MASS INDEX (BMI) DOCUMENTED: ICD-10-PCS | Mod: CPTII,S$GLB,, | Performed by: PHYSICIAN ASSISTANT

## 2021-03-31 RX ORDER — MUPIROCIN 20 MG/G
OINTMENT TOPICAL
Qty: 22 G | Refills: 1 | Status: SHIPPED | OUTPATIENT
Start: 2021-03-31

## 2021-03-31 RX ORDER — CHLORHEXIDINE GLUCONATE ORAL RINSE 1.2 MG/ML
SOLUTION DENTAL
Qty: 420 ML | Refills: 0 | Status: SHIPPED | OUTPATIENT
Start: 2021-03-31

## 2021-03-31 RX ORDER — AMOXICILLIN AND CLAVULANATE POTASSIUM 875; 125 MG/1; MG/1
1 TABLET, FILM COATED ORAL 2 TIMES DAILY
Qty: 20 TABLET | Refills: 0 | Status: SHIPPED | OUTPATIENT
Start: 2021-03-31 | End: 2021-04-10

## 2021-06-21 ENCOUNTER — PES CALL (OUTPATIENT)
Dept: ADMINISTRATIVE | Facility: CLINIC | Age: 66
End: 2021-06-21

## 2021-07-19 ENCOUNTER — TELEPHONE (OUTPATIENT)
Dept: INTERNAL MEDICINE | Facility: CLINIC | Age: 66
End: 2021-07-19

## 2021-07-23 ENCOUNTER — OFFICE VISIT (OUTPATIENT)
Dept: INTERNAL MEDICINE | Facility: CLINIC | Age: 66
End: 2021-07-23
Payer: MEDICARE

## 2021-07-23 ENCOUNTER — TELEPHONE (OUTPATIENT)
Dept: INTERNAL MEDICINE | Facility: CLINIC | Age: 66
End: 2021-07-23

## 2021-07-23 DIAGNOSIS — F43.10 PTSD (POST-TRAUMATIC STRESS DISORDER): ICD-10-CM

## 2021-07-23 DIAGNOSIS — F41.9 ANXIETY: Primary | ICD-10-CM

## 2021-07-23 PROCEDURE — 99442 PR PHYSICIAN TELEPHONE EVALUATION 11-20 MIN: ICD-10-PCS | Mod: 95,,, | Performed by: INTERNAL MEDICINE

## 2021-07-23 PROCEDURE — 99442 PR PHYSICIAN TELEPHONE EVALUATION 11-20 MIN: CPT | Mod: 95,,, | Performed by: INTERNAL MEDICINE

## 2021-07-23 RX ORDER — BUSPIRONE HYDROCHLORIDE 10 MG/1
10 TABLET ORAL 3 TIMES DAILY PRN
Qty: 90 TABLET | Refills: 11 | Status: SHIPPED | OUTPATIENT
Start: 2021-07-23 | End: 2022-07-23

## 2022-01-11 ENCOUNTER — PATIENT OUTREACH (OUTPATIENT)
Dept: ADMINISTRATIVE | Facility: HOSPITAL | Age: 67
End: 2022-01-11
Payer: MEDICARE

## 2022-01-11 DIAGNOSIS — Z12.11 COLON CANCER SCREENING: Primary | ICD-10-CM

## 2022-01-27 ENCOUNTER — LAB VISIT (OUTPATIENT)
Dept: LAB | Facility: HOSPITAL | Age: 67
End: 2022-01-27
Attending: INTERNAL MEDICINE
Payer: MEDICARE

## 2022-01-27 DIAGNOSIS — Z12.11 ENCOUNTER FOR FIT (FECAL IMMUNOCHEMICAL TEST) SCREENING: ICD-10-CM

## 2022-01-27 PROCEDURE — 82274 ASSAY TEST FOR BLOOD FECAL: CPT | Mod: HCNC | Performed by: INTERNAL MEDICINE

## 2022-01-30 RX ORDER — ALENDRONATE SODIUM 70 MG/1
70 TABLET ORAL
Qty: 4 TABLET | Refills: 11 | Status: SHIPPED | OUTPATIENT
Start: 2022-01-30 | End: 2022-05-04 | Stop reason: SDUPTHER

## 2022-02-02 DIAGNOSIS — Z12.11 ENCOUNTER FOR FIT (FECAL IMMUNOCHEMICAL TEST) SCREENING: Primary | ICD-10-CM

## 2022-02-04 LAB — HEMOCCULT STL QL IA: NEGATIVE

## 2022-02-21 ENCOUNTER — TELEPHONE (OUTPATIENT)
Dept: INTERNAL MEDICINE | Facility: CLINIC | Age: 67
End: 2022-02-21
Payer: MEDICARE

## 2022-02-21 DIAGNOSIS — Z00.00 ANNUAL PHYSICAL EXAM: Primary | ICD-10-CM

## 2022-02-21 DIAGNOSIS — G47.00 INSOMNIA, UNSPECIFIED TYPE: ICD-10-CM

## 2022-02-21 DIAGNOSIS — K70.9 ALCOHOLIC LIVER DISEASE: ICD-10-CM

## 2022-02-21 DIAGNOSIS — R79.89 ELEVATED LFTS: ICD-10-CM

## 2022-02-21 NOTE — TELEPHONE ENCOUNTER
----- Message from Rama Gordon sent at 2/21/2022 10:10 AM CST -----  Contact: Pt [phone]  Doctor appointment and lab have been scheduled.  Please link lab orders to the lab appointment.  Date of doctor appointment:  7/28  Date of lab appointment:  7/21  Physical or F/U: Annual   Comments:

## 2022-05-04 DIAGNOSIS — F41.9 ANXIETY: ICD-10-CM

## 2022-05-04 RX ORDER — PAROXETINE 10 MG/1
TABLET, FILM COATED ORAL
Qty: 90 TABLET | Refills: 0 | OUTPATIENT
Start: 2022-05-04

## 2022-05-04 RX ORDER — PAROXETINE HYDROCHLORIDE 20 MG/1
TABLET, FILM COATED ORAL
Qty: 90 TABLET | Refills: 0 | OUTPATIENT
Start: 2022-05-04

## 2022-05-04 RX ORDER — ALENDRONATE SODIUM 70 MG/1
70 TABLET ORAL
Qty: 13 TABLET | Refills: 0 | Status: SHIPPED | OUTPATIENT
Start: 2022-05-04 | End: 2023-01-31

## 2022-05-04 NOTE — TELEPHONE ENCOUNTER
[facility] Note  Quick DC. Inappropriate Request   Refill request requires further review by MD: NO   Medication Therapy Plan: Pharmacy is requesting new script(s) for the following medications without required information, (sig/ frequency/qty/etc)     [facility] action(s):  Quick Discontinue      Duplicate Pended Encounter(s)/ Last Prescribed Details:    Pharmacies have been requesting medications for patients without required information, (sig, frequency, qty, etc.). In addition, requests are sent for medication(s) pt. are currently not taking, and medications patients have never taken.    We have spoken to the pharmacies about these request types and advised their teams previously that we are unable to assess these New Script requests and require all details for these requests. This is a known issue and has been reported.        Medication related problems are not assessed for [facility].   Medication Reconciliation Completed? NO Were there pending details that required adjustment? NO     Automatic Epic Generated Protocol Data Below:   Requested Prescriptions     Pending Prescriptions Disp Refills    paroxetine (PAXIL) 10 MG tablet [Pharmacy Med Name: PAROXETINE HCL 10MG TAB] 90 tablet 0              Appointments      Date Provider   Last Visit   7/23/2021 Freda Wooten DO   Next Visit   5/4/2022 Freda Wooten DO        Note composed:10:28 AM 05/04/2022

## 2022-05-04 NOTE — TELEPHONE ENCOUNTER
[facility] Note  Quick DC. Inappropriate Request   Refill request requires further review by MD: NO   Medication Therapy Plan: Pharmacy is requesting new script(s) for the following medications without required information, (sig/ frequency/qty/etc)     [facility] action(s):  Quick Discontinue      Duplicate Pended Encounter(s)/ Last Prescribed Details:    Pharmacies have been requesting medications for patients without required information, (sig, frequency, qty, etc.). In addition, requests are sent for medication(s) pt. are currently not taking, and medications patients have never taken.    We have spoken to the pharmacies about these request types and advised their teams previously that we are unable to assess these New Script requests and require all details for these requests. This is a known issue and has been reported.        Medication related problems are not assessed for [facility].   Medication Reconciliation Completed? NO Were there pending details that required adjustment? NO     Automatic Epic Generated Protocol Data Below:   Requested Prescriptions     Pending Prescriptions Disp Refills    paroxetine (PAXIL) 20 MG tablet [Pharmacy Med Name: PAROXETINE HCL 20MG TAB] 90 tablet 0              Appointments      Date Provider   Last Visit   7/23/2021 Freda Wooten DO   Next Visit   5/4/2022 Freda Wooten DO        Note composed:10:29 AM 05/04/2022

## 2022-05-04 NOTE — TELEPHONE ENCOUNTER
No new care gaps identified.  [facility] Embedded Care Gaps. Reference number: [id number]. 5/04/2022   10:25:03 AM RAGHUT

## 2022-05-04 NOTE — TELEPHONE ENCOUNTER
No new care gaps identified.  [facility] Embedded Care Gaps. Reference number: [id number]. 5/04/2022   10:24:26 AM RAGHUT

## 2022-05-06 DIAGNOSIS — F41.9 ANXIETY: ICD-10-CM

## 2022-05-06 RX ORDER — PAROXETINE HYDROCHLORIDE 20 MG/1
20 TABLET, FILM COATED ORAL DAILY
Qty: 30 TABLET | Refills: 11 | Status: SHIPPED | OUTPATIENT
Start: 2022-05-06 | End: 2023-05-24

## 2022-05-06 RX ORDER — PAROXETINE 10 MG/1
10 TABLET, FILM COATED ORAL DAILY
Qty: 30 TABLET | Refills: 11 | Status: SHIPPED | OUTPATIENT
Start: 2022-05-06 | End: 2023-05-24

## 2022-05-06 NOTE — TELEPHONE ENCOUNTER
No new care gaps identified.  [facility] Embedded Care Gaps. Reference number: [id number]. 5/06/2022   2:16:34 PM CDT

## 2022-05-06 NOTE — TELEPHONE ENCOUNTER
----- Message from Juliet Stafford sent at 5/6/2022  9:12 AM CDT -----  Contact: Pt [phone]  Requesting an RX refill or new RX.  Is this a refill or new RX: Refill  RX name and strength (copy/paste from chart): paroxetine (PAXIL) 10 MG tablet  Is this a 30 day or 90 day RX: 30  Pharmacy name and phone # (copy/paste from chart):    [facility] ([place]) - [place] - [address] [place] [address]  [address] [place] [address]  [place] [address]  Phone: [phone] Fax: [phone]    Requesting an RX refill or new RX.  Is this a refill or new RX: Refill  RX name and strength (copy/paste from chart):  paroxetine (PAXIL) 20 MG tablet  Is this a 30 day or 90 day RX: 30  Pharmacy name and phone # (copy/paste from chart):    [facility] ([place]) - [place] - [address] [place] [address]  [address]  [place] [address]  Phone: [phone] Fax: [phone]    Comment:  Patient has a scheduled appointment with you on 7/28/2022    Please call and advise.    Thank You

## 2022-12-20 ENCOUNTER — TELEPHONE (OUTPATIENT)
Dept: INTERNAL MEDICINE | Facility: CLINIC | Age: 67
End: 2022-12-20
Payer: MEDICARE

## 2022-12-20 NOTE — TELEPHONE ENCOUNTER
----- Message from Haydee Samuels sent at 12/20/2022 12:34 PM CST -----  Contact: pt [phone]  1MEDICALADVICE     Patient is calling for Medical Advice regarding:  sore throat, cough and runny nose green mucus    How long has patient had these symptoms: 4 day    Pharmacy name and phone#:   [facility] ([place]) - [place] - [address]  [address] [place] [address]  Phone: [phone] Fax: [phone]          Would like response via [place]:  call    Comments:    .

## 2023-03-01 DIAGNOSIS — Z78.0 MENOPAUSE: ICD-10-CM

## 2023-04-06 ENCOUNTER — TELEPHONE (OUTPATIENT)
Dept: INTERNAL MEDICINE | Facility: CLINIC | Age: 68
End: 2023-04-06
Payer: MEDICARE

## 2023-04-06 NOTE — TELEPHONE ENCOUNTER
----- Message from Sheltonmaosn Alan sent at 4/6/2023 11:29 AM CDT -----  Contact: self [phone]  Patient would like to get a referral.  Referral to what specialty:  ophthalmogy   Does the patient want the referral with a specific physician:  Dr. [facility]  Is the specialist [facility]:  [facility]  Reason (be specific):  annual  Does the patient already have the specialty clinic appointment scheduled:  no  If yes, what date is the appointment scheduled:     Is the insurance listed in Epic correct? (this is important for a referral):  yes  Advised patient that once provider approves this either a nurse or  will return their call?:   Would the patient like a call back, or a response through their [facility] portal?:   call back  Comments:       Caller is requesting to schedule their annual screening mammogram appointment. Order is not listed in Epic.  Please enter order and contact patient to schedule.  Would the patient like a call back, or a response through their [facility] portal?:   call back         Please call and advise

## 2023-04-06 NOTE — TELEPHONE ENCOUNTER
Patient visited the eye dr and was told by her insurance that they would not cover her eye doctor visit .  Instructed that she call the eye doctor and see if the billed something different .

## 2023-04-12 ENCOUNTER — TELEPHONE (OUTPATIENT)
Dept: INTERNAL MEDICINE | Facility: CLINIC | Age: 68
End: 2023-04-12
Payer: MEDICARE

## 2023-04-12 NOTE — TELEPHONE ENCOUNTER
----- Message from Le Morejon sent at 4/11/2023  9:00 AM CDT -----  Contact: SERGIO BLAKE [[id number]][phone]  Type: Appointment Request     Name of Caller: SERGIO BLAKE [[id number]]  When is the first available appointment? Do not have access  Reason for Visit:  Annual Visit (received text)  Best Call Back Number: [phone]  Additional Information: Patient states that she has been trying to schedule an appt but was told that it will take up to 4 months for her to be seen and need to be seen sooner

## 2023-04-18 ENCOUNTER — PATIENT OUTREACH (OUTPATIENT)
Dept: ADMINISTRATIVE | Facility: HOSPITAL | Age: 68
End: 2023-04-18
Payer: MEDICARE

## 2023-04-18 NOTE — PROGRESS NOTES
Health Maintenance Due   Topic Date Due    COVID-19 Vaccine (4 - Booster for Moderna series) 07/27/2021    Pneumococcal Vaccines (Age 65+) (2 - PCV) 08/31/2021    Hemoglobin A1c (Prediabetes)  12/31/2021    Mammogram  12/31/2021    Influenza Vaccine (1) 09/01/2022    DEXA Scan  01/05/2023     Chart reviewed.   Immunizations: Reconciled  Orders placed: N/A  Upcoming appts to satisfy [facility] topics: N/A

## 2023-04-25 ENCOUNTER — TELEPHONE (OUTPATIENT)
Dept: INTERNAL MEDICINE | Facility: CLINIC | Age: 68
End: 2023-04-25
Payer: MEDICARE

## 2023-04-25 DIAGNOSIS — H53.8 BLURRED VISION: Primary | ICD-10-CM

## 2023-04-25 NOTE — TELEPHONE ENCOUNTER
----- Message from Sebas Hernandez sent at 4/25/2023  9:48 AM CDT -----  Contact: Pt [phone]  Patient would like to get a referral.  Referral to what specialty:  Ophthalmology   Does the patient want the referral with a specific physician:  [facility]  [facility] the [facility] physician: Non    Reason (be specific):  Blurred vision right eye  Does the patient already have the specialty clinic appointment scheduled: no   If yes, what date is the appointment scheduled:     Is the insurance listed in Epic correct? (this is important for a referral):    Advised patient that once provider approves this either a nurse or  will return their call?:   Would the patient like a call back, or a response through their [facility] portal?:   call  Comments:

## 2023-04-25 NOTE — TELEPHONE ENCOUNTER
Spoke to patient and informed her that referral was faxed. Advised that doctor put in a referral  for [facility]  also so she has the  options to see someone in [facility] system.

## 2023-05-24 DIAGNOSIS — F41.9 ANXIETY: ICD-10-CM

## 2023-05-24 RX ORDER — PAROXETINE HYDROCHLORIDE 20 MG/1
20 TABLET, FILM COATED ORAL DAILY
Qty: 30 TABLET | Refills: 11 | Status: SHIPPED | OUTPATIENT
Start: 2023-05-24

## 2023-05-24 RX ORDER — PAROXETINE 10 MG/1
10 TABLET, FILM COATED ORAL DAILY
Qty: 30 TABLET | Refills: 11 | Status: SHIPPED | OUTPATIENT
Start: 2023-05-24

## 2023-05-24 NOTE — TELEPHONE ENCOUNTER
Care Due:                  Date            Visit Type   Department     Provider  --------------------------------------------------------------------------------                                VIRTUAL      [facility] INTERNAL  Last Visit: 07-      AUDIO ONLY   MEDICINE       Freda Wooten  Next Visit: None Scheduled  None         None Found                                                            Last  Test          Frequency    Reason                     Performed    Due Date  --------------------------------------------------------------------------------    Office Visit  12 months..  paroxetine...............  07- 07-    [facility] Embedded Care Due Messages. Reference number: [id number].   5/24/2023 8:42:40 AM CDT

## 2023-05-26 ENCOUNTER — TELEPHONE (OUTPATIENT)
Dept: OPHTHALMOLOGY | Facility: CLINIC | Age: 68
End: 2023-05-26
Payer: MEDICARE

## 2023-05-26 NOTE — TELEPHONE ENCOUNTER
----- Message from Kathleen Negron sent at 5/26/2023 10:28 AM CDT -----  Regarding: Appointment  Pt is experiencing blacked out vision or temporary loss of vision. It has occurred twice. They are requesting an appt before August.       Carla @ [phone]

## 2023-06-17 NOTE — TELEPHONE ENCOUNTER
----- Message from Estela Hollingsworth sent at 10/9/2018  3:05 PM CDT -----  Pt wants to cancel and will call back to reschedule appt  
MA called patient back, she is unable to reached left her VM to please give us a callback.   
Alert and oriented, no focal deficits.

## 2023-06-26 ENCOUNTER — PES CALL (OUTPATIENT)
Dept: ADMINISTRATIVE | Facility: CLINIC | Age: 68
End: 2023-06-26
Payer: MEDICARE

## 2023-07-26 ENCOUNTER — PES CALL (OUTPATIENT)
Dept: ADMINISTRATIVE | Facility: CLINIC | Age: 68
End: 2023-07-26
Payer: MEDICARE

## 2024-02-02 RX ORDER — ALENDRONATE SODIUM 70 MG/1
70 TABLET ORAL
Qty: 4 TABLET | Refills: 11 | OUTPATIENT
Start: 2024-02-02

## (undated) DEVICE — SOL BETADINE 5%

## (undated) DEVICE — GLASSES EYE PROTECTIVE

## (undated) DEVICE — GLOVE BIOGEL SKINSENSE PI 6.5

## (undated) DEVICE — SOL WATER STRL IRR 1000ML

## (undated) DEVICE — GLOVE BIOGEL SKINSENSE PI 7.5

## (undated) DEVICE — CASSETTE INFINITI

## (undated) DEVICE — Device

## (undated) DEVICE — NDL SPINAL 22GX5

## (undated) DEVICE — KIT NERVE BLOCK PREP BAPTIST